# Patient Record
Sex: FEMALE | Race: WHITE | NOT HISPANIC OR LATINO | Employment: FULL TIME | URBAN - METROPOLITAN AREA
[De-identification: names, ages, dates, MRNs, and addresses within clinical notes are randomized per-mention and may not be internally consistent; named-entity substitution may affect disease eponyms.]

---

## 2017-04-19 ENCOUNTER — APPOINTMENT (OUTPATIENT)
Dept: WOMENS IMAGING | Facility: HOSPITAL | Age: 52
End: 2017-04-19

## 2017-04-19 PROCEDURE — 77067 SCR MAMMO BI INCL CAD: CPT | Performed by: RADIOLOGY

## 2017-04-19 PROCEDURE — 77063 BREAST TOMOSYNTHESIS BI: CPT | Performed by: RADIOLOGY

## 2017-05-22 ENCOUNTER — OFFICE VISIT (OUTPATIENT)
Dept: CARDIOLOGY | Facility: CLINIC | Age: 52
End: 2017-05-22

## 2017-05-22 VITALS
SYSTOLIC BLOOD PRESSURE: 140 MMHG | WEIGHT: 230 LBS | BODY MASS INDEX: 36.96 KG/M2 | HEIGHT: 66 IN | HEART RATE: 92 BPM | DIASTOLIC BLOOD PRESSURE: 80 MMHG

## 2017-05-22 DIAGNOSIS — J44.9 CHRONIC OBSTRUCTIVE PULMONARY DISEASE, UNSPECIFIED COPD TYPE (HCC): ICD-10-CM

## 2017-05-22 DIAGNOSIS — E78.2 MIXED HYPERLIPIDEMIA: ICD-10-CM

## 2017-05-22 DIAGNOSIS — I51.7 CARDIOMEGALY: ICD-10-CM

## 2017-05-22 DIAGNOSIS — R06.02 SHORTNESS OF BREATH: ICD-10-CM

## 2017-05-22 DIAGNOSIS — I10 ESSENTIAL HYPERTENSION: Primary | ICD-10-CM

## 2017-05-22 DIAGNOSIS — E66.9 OBESITY WITH BODY MASS INDEX OF 30.0-39.9: ICD-10-CM

## 2017-05-22 PROCEDURE — 99213 OFFICE O/P EST LOW 20 MIN: CPT | Performed by: NURSE PRACTITIONER

## 2017-05-22 RX ORDER — UBIDECARENONE 100 MG
100 CAPSULE ORAL DAILY
COMMUNITY
End: 2018-11-06 | Stop reason: ALTCHOICE

## 2017-05-22 RX ORDER — TRAMADOL HYDROCHLORIDE 50 MG/1
50 TABLET ORAL AS NEEDED
COMMUNITY
End: 2021-05-13 | Stop reason: HOSPADM

## 2017-05-22 RX ORDER — LANOLIN ALCOHOL/MO/W.PET/CERES
500 CREAM (GRAM) TOPICAL DAILY
COMMUNITY
End: 2022-05-24

## 2017-05-22 RX ORDER — AMOXICILLIN 875 MG/1
875 TABLET, COATED ORAL 2 TIMES DAILY
COMMUNITY
End: 2017-12-11 | Stop reason: ALTCHOICE

## 2017-05-22 RX ORDER — PAROXETINE HYDROCHLORIDE 20 MG/1
20 TABLET, FILM COATED ORAL EVERY MORNING
COMMUNITY
End: 2018-11-06 | Stop reason: DRUGHIGH

## 2017-05-22 RX ORDER — BENZONATATE 100 MG/1
100 CAPSULE ORAL 3 TIMES DAILY PRN
COMMUNITY
End: 2017-12-11 | Stop reason: ALTCHOICE

## 2017-05-22 RX ORDER — VALACYCLOVIR HYDROCHLORIDE 1 G/1
1000 TABLET, FILM COATED ORAL AS NEEDED
COMMUNITY

## 2017-05-22 RX ORDER — MULTIVITAMIN WITH IRON
TABLET ORAL DAILY
COMMUNITY
End: 2019-05-02 | Stop reason: ALTCHOICE

## 2017-05-22 RX ORDER — PREDNISONE 1 MG/1
5 TABLET ORAL DAILY
COMMUNITY
End: 2017-12-11 | Stop reason: ALTCHOICE

## 2017-05-22 RX ORDER — MELATONIN
1000 DAILY
COMMUNITY
End: 2018-11-06 | Stop reason: ALTCHOICE

## 2017-12-11 ENCOUNTER — OFFICE VISIT (OUTPATIENT)
Dept: CARDIOLOGY | Facility: CLINIC | Age: 52
End: 2017-12-11

## 2017-12-11 VITALS
WEIGHT: 231 LBS | HEIGHT: 66 IN | HEART RATE: 80 BPM | DIASTOLIC BLOOD PRESSURE: 80 MMHG | BODY MASS INDEX: 37.12 KG/M2 | SYSTOLIC BLOOD PRESSURE: 128 MMHG

## 2017-12-11 DIAGNOSIS — I10 ESSENTIAL HYPERTENSION: Primary | ICD-10-CM

## 2017-12-11 DIAGNOSIS — J44.9 COPD MIXED TYPE (HCC): ICD-10-CM

## 2017-12-11 DIAGNOSIS — E66.9 OBESITY WITH BODY MASS INDEX OF 30.0-39.9: ICD-10-CM

## 2017-12-11 DIAGNOSIS — I51.7 CARDIOMEGALY: ICD-10-CM

## 2017-12-11 DIAGNOSIS — E78.2 MIXED HYPERLIPIDEMIA: ICD-10-CM

## 2017-12-11 PROCEDURE — 99213 OFFICE O/P EST LOW 20 MIN: CPT | Performed by: NURSE PRACTITIONER

## 2018-05-09 ENCOUNTER — APPOINTMENT (OUTPATIENT)
Dept: WOMENS IMAGING | Facility: HOSPITAL | Age: 53
End: 2018-05-09

## 2018-05-09 PROCEDURE — 77063 BREAST TOMOSYNTHESIS BI: CPT | Performed by: RADIOLOGY

## 2018-05-09 PROCEDURE — 77067 SCR MAMMO BI INCL CAD: CPT | Performed by: RADIOLOGY

## 2018-10-23 ENCOUNTER — TELEPHONE (OUTPATIENT)
Dept: CARDIOLOGY | Facility: CLINIC | Age: 53
End: 2018-10-23

## 2018-11-06 ENCOUNTER — OFFICE VISIT (OUTPATIENT)
Dept: CARDIOLOGY | Facility: CLINIC | Age: 53
End: 2018-11-06

## 2018-11-06 VITALS
HEART RATE: 88 BPM | BODY MASS INDEX: 35.03 KG/M2 | WEIGHT: 218 LBS | SYSTOLIC BLOOD PRESSURE: 140 MMHG | HEIGHT: 66 IN | DIASTOLIC BLOOD PRESSURE: 70 MMHG

## 2018-11-06 DIAGNOSIS — E78.2 MIXED HYPERLIPIDEMIA: ICD-10-CM

## 2018-11-06 DIAGNOSIS — I51.7 CARDIOMEGALY: ICD-10-CM

## 2018-11-06 DIAGNOSIS — I10 ESSENTIAL HYPERTENSION: ICD-10-CM

## 2018-11-06 DIAGNOSIS — R53.83 OTHER FATIGUE: ICD-10-CM

## 2018-11-06 DIAGNOSIS — I31.39 PERICARDIAL EFFUSION: Primary | ICD-10-CM

## 2018-11-06 DIAGNOSIS — R06.02 SHORTNESS OF BREATH: ICD-10-CM

## 2018-11-06 DIAGNOSIS — J44.9 CHRONIC OBSTRUCTIVE PULMONARY DISEASE, UNSPECIFIED COPD TYPE (HCC): ICD-10-CM

## 2018-11-06 PROCEDURE — 99214 OFFICE O/P EST MOD 30 MIN: CPT | Performed by: NURSE PRACTITIONER

## 2018-11-06 RX ORDER — BUDESONIDE AND FORMOTEROL FUMARATE DIHYDRATE 160; 4.5 UG/1; UG/1
2 AEROSOL RESPIRATORY (INHALATION)
COMMUNITY
End: 2021-03-22

## 2018-11-06 RX ORDER — PAROXETINE 30 MG/1
30 TABLET, FILM COATED ORAL DAILY
COMMUNITY

## 2018-11-06 RX ORDER — BENZONATATE 100 MG/1
100 CAPSULE ORAL 3 TIMES DAILY PRN
COMMUNITY
End: 2019-05-02 | Stop reason: ALTCHOICE

## 2018-11-06 RX ORDER — LANSOPRAZOLE 30 MG/1
30 CAPSULE, DELAYED RELEASE ORAL DAILY PRN
COMMUNITY

## 2018-11-06 RX ORDER — FUROSEMIDE 20 MG/1
20 TABLET ORAL TAKE AS DIRECTED
COMMUNITY
End: 2018-11-09 | Stop reason: SDUPTHER

## 2018-11-06 RX ORDER — FEXOFENADINE HCL 180 MG/1
180 TABLET ORAL DAILY
COMMUNITY

## 2018-11-09 ENCOUNTER — TELEPHONE (OUTPATIENT)
Dept: CARDIOLOGY | Facility: CLINIC | Age: 53
End: 2018-11-09

## 2018-11-09 RX ORDER — FUROSEMIDE 20 MG/1
20 TABLET ORAL TAKE AS DIRECTED
Qty: 30 TABLET | Refills: 6 | Status: SHIPPED | OUTPATIENT
Start: 2018-11-09 | End: 2018-11-09 | Stop reason: SDUPTHER

## 2018-11-09 RX ORDER — FUROSEMIDE 20 MG/1
TABLET ORAL
Qty: 30 TABLET | Refills: 6 | Status: SHIPPED | OUTPATIENT
Start: 2018-11-09 | End: 2019-05-06 | Stop reason: SDUPTHER

## 2018-11-14 ENCOUNTER — HOSPITAL ENCOUNTER (OUTPATIENT)
Dept: CARDIOLOGY | Facility: HOSPITAL | Age: 53
Discharge: HOME | End: 2018-11-14
Admitting: NURSE PRACTITIONER

## 2018-11-14 VITALS — WEIGHT: 218.03 LBS | BODY MASS INDEX: 35.04 KG/M2 | HEIGHT: 66 IN

## 2018-11-14 DIAGNOSIS — R06.02 SHORTNESS OF BREATH: ICD-10-CM

## 2018-11-14 DIAGNOSIS — I31.39 PERICARDIAL EFFUSION: ICD-10-CM

## 2018-11-14 PROCEDURE — 93306 TTE W/DOPPLER COMPLETE: CPT

## 2018-11-14 PROCEDURE — 93306 TTE W/DOPPLER COMPLETE: CPT | Performed by: INTERNAL MEDICINE

## 2018-11-16 DIAGNOSIS — I31.39 PERICARDIAL EFFUSION: Primary | ICD-10-CM

## 2018-11-16 DIAGNOSIS — Z79.899 MEDICATION MANAGEMENT: ICD-10-CM

## 2018-11-16 LAB
BH CV ECHO MEAS - AO MAX PG: 12 MMHG
BH CV ECHO MEAS - AO MEAN PG: 5 MMHG
BH CV ECHO MEAS - AO ROOT AREA (BSA CORRECTED): 1.3
BH CV ECHO MEAS - AO ROOT AREA: 5.8 CM^2
BH CV ECHO MEAS - AO ROOT DIAM: 2.7 CM
BH CV ECHO MEAS - AO V2 MAX: 173 CM/SEC
BH CV ECHO MEAS - AO V2 MEAN: 100.8 CM/SEC
BH CV ECHO MEAS - AO V2 VTI: 29.7 CM
BH CV ECHO MEAS - BSA(HAYCOCK): 2.2 M^2
BH CV ECHO MEAS - BSA: 2.1 M^2
BH CV ECHO MEAS - BZI_BMI: 35.2 KILOGRAMS/M^2
BH CV ECHO MEAS - BZI_METRIC_HEIGHT: 167.6 CM
BH CV ECHO MEAS - BZI_METRIC_WEIGHT: 98.9 KG
BH CV ECHO MEAS - EDV(CUBED): 83.7 ML
BH CV ECHO MEAS - EDV(TEICH): 86.5 ML
BH CV ECHO MEAS - EF(CUBED): 76 %
BH CV ECHO MEAS - EF(TEICH): 68.3 %
BH CV ECHO MEAS - ESV(CUBED): 20.1 ML
BH CV ECHO MEAS - ESV(TEICH): 27.4 ML
BH CV ECHO MEAS - FS: 37.9 %
BH CV ECHO MEAS - IVS/LVPW: 0.98
BH CV ECHO MEAS - IVSD: 1.1 CM
BH CV ECHO MEAS - LA DIMENSION: 3.8 CM
BH CV ECHO MEAS - LA/AO: 1.4
BH CV ECHO MEAS - LV IVRT: 0.08 SEC
BH CV ECHO MEAS - LV MASS(C)D: 164.8 GRAMS
BH CV ECHO MEAS - LV MASS(C)DI: 79.4 GRAMS/M^2
BH CV ECHO MEAS - LVIDD: 4.4 CM
BH CV ECHO MEAS - LVIDS: 2.7 CM
BH CV ECHO MEAS - LVPWD: 1.1 CM
BH CV ECHO MEAS - MITRAL HR: 262.7 BPM
BH CV ECHO MEAS - MITRAL R-R: 0.23 SEC
BH CV ECHO MEAS - MV A MAX VEL: 115.3 CM/SEC
BH CV ECHO MEAS - MV DEC SLOPE: 557.4 CM/SEC^2
BH CV ECHO MEAS - MV DEC TIME: 0.15 SEC
BH CV ECHO MEAS - MV E MAX VEL: 80.9 CM/SEC
BH CV ECHO MEAS - MV E/A: 0.7
BH CV ECHO MEAS - MV MAX PG: 8.3 MMHG
BH CV ECHO MEAS - MV MEAN PG: 4.7 MMHG
BH CV ECHO MEAS - MV V2 MAX: 144.1 CM/SEC
BH CV ECHO MEAS - MV V2 MEAN: 102.6 CM/SEC
BH CV ECHO MEAS - MV V2 VTI: 23.4 CM
BH CV ECHO MEAS - PA MAX PG: 7 MMHG
BH CV ECHO MEAS - PA MEAN PG: 3.4 MMHG
BH CV ECHO MEAS - PA V2 MAX: 132.5 CM/SEC
BH CV ECHO MEAS - PA V2 MEAN: 82.3 CM/SEC
BH CV ECHO MEAS - PA V2 VTI: 25.3 CM
BH CV ECHO MEAS - PULM. HR: 194.8 BPM
BH CV ECHO MEAS - PULM. R-R: 0.31 SEC
BH CV ECHO MEAS - RAP SYSTOLE: 10 MMHG
BH CV ECHO MEAS - RVDD: 2.8 CM
BH CV ECHO MEAS - RVSP: 17.8 MMHG
BH CV ECHO MEAS - SI(AO): 83.2 ML/M^2
BH CV ECHO MEAS - SI(CUBED): 30.7 ML/M^2
BH CV ECHO MEAS - SI(TEICH): 28.4 ML/M^2
BH CV ECHO MEAS - SV(AO): 172.7 ML
BH CV ECHO MEAS - SV(CUBED): 63.6 ML
BH CV ECHO MEAS - SV(TEICH): 59 ML
BH CV ECHO MEAS - TR MAX VEL: 139.7 CM/SEC
MAXIMAL PREDICTED HEART RATE: 165 BPM
STRESS TARGET HR: 140 BPM

## 2018-11-19 ENCOUNTER — LAB (OUTPATIENT)
Dept: LAB | Facility: HOSPITAL | Age: 53
End: 2018-11-19

## 2018-11-19 DIAGNOSIS — I31.39 PERICARDIAL EFFUSION: ICD-10-CM

## 2018-11-19 DIAGNOSIS — Z79.899 MEDICATION MANAGEMENT: ICD-10-CM

## 2018-11-19 LAB — ERYTHROCYTE [SEDIMENTATION RATE] IN BLOOD: 18 MM/HR (ref 0–30)

## 2018-11-19 PROCEDURE — 85652 RBC SED RATE AUTOMATED: CPT | Performed by: NURSE PRACTITIONER

## 2018-11-19 PROCEDURE — 36415 COLL VENOUS BLD VENIPUNCTURE: CPT

## 2018-11-20 ENCOUNTER — TELEPHONE (OUTPATIENT)
Dept: CARDIOLOGY | Facility: CLINIC | Age: 53
End: 2018-11-20

## 2019-01-18 ENCOUNTER — TELEPHONE (OUTPATIENT)
Dept: CARDIOLOGY | Facility: CLINIC | Age: 54
End: 2019-01-18

## 2019-01-18 DIAGNOSIS — R06.02 SHORTNESS OF BREATH: ICD-10-CM

## 2019-01-18 DIAGNOSIS — I31.39 PERICARDIAL EFFUSION: Primary | ICD-10-CM

## 2019-01-18 DIAGNOSIS — Z79.899 MEDICATION MANAGEMENT: ICD-10-CM

## 2019-05-02 ENCOUNTER — DOCUMENTATION (OUTPATIENT)
Dept: CARDIOLOGY | Facility: CLINIC | Age: 54
End: 2019-05-02

## 2019-05-02 ENCOUNTER — OFFICE VISIT (OUTPATIENT)
Dept: CARDIOLOGY | Facility: CLINIC | Age: 54
End: 2019-05-02

## 2019-05-02 VITALS
SYSTOLIC BLOOD PRESSURE: 140 MMHG | BODY MASS INDEX: 36 KG/M2 | WEIGHT: 224 LBS | HEIGHT: 66 IN | DIASTOLIC BLOOD PRESSURE: 70 MMHG | HEART RATE: 68 BPM

## 2019-05-02 DIAGNOSIS — I10 ESSENTIAL HYPERTENSION: Primary | ICD-10-CM

## 2019-05-02 DIAGNOSIS — E78.2 MIXED HYPERLIPIDEMIA: ICD-10-CM

## 2019-05-02 DIAGNOSIS — I51.7 CARDIOMEGALY: ICD-10-CM

## 2019-05-02 DIAGNOSIS — R25.2 LEG CRAMPS: ICD-10-CM

## 2019-05-02 DIAGNOSIS — R07.89 CHEST DISCOMFORT: ICD-10-CM

## 2019-05-02 DIAGNOSIS — R00.2 PALPITATIONS: ICD-10-CM

## 2019-05-02 DIAGNOSIS — R09.89 LABILE HYPERTENSION: ICD-10-CM

## 2019-05-02 PROCEDURE — 99214 OFFICE O/P EST MOD 30 MIN: CPT | Performed by: NURSE PRACTITIONER

## 2019-05-02 RX ORDER — LOSARTAN POTASSIUM 25 MG/1
25 TABLET ORAL DAILY
COMMUNITY
End: 2019-11-12 | Stop reason: SDUPTHER

## 2019-05-02 RX ORDER — ALBUTEROL SULFATE 2.5 MG/3ML
2.5 SOLUTION RESPIRATORY (INHALATION) EVERY 4 HOURS PRN
COMMUNITY

## 2019-05-02 RX ORDER — CHOLECALCIFEROL (VITAMIN D3) 1250 MCG
50000 CAPSULE ORAL DAILY
COMMUNITY

## 2019-05-02 RX ORDER — DOXYCYCLINE HYCLATE 100 MG/1
100 CAPSULE ORAL 2 TIMES DAILY
COMMUNITY
End: 2019-06-17 | Stop reason: ALTCHOICE

## 2019-05-03 ENCOUNTER — TELEPHONE (OUTPATIENT)
Dept: CARDIOLOGY | Facility: CLINIC | Age: 54
End: 2019-05-03

## 2019-05-06 ENCOUNTER — TELEPHONE (OUTPATIENT)
Dept: CARDIOLOGY | Facility: CLINIC | Age: 54
End: 2019-05-06

## 2019-05-06 RX ORDER — FUROSEMIDE 20 MG/1
TABLET ORAL
Qty: 30 TABLET | Refills: 6 | Status: SHIPPED | OUTPATIENT
Start: 2019-05-06 | End: 2019-06-17

## 2019-05-07 ENCOUNTER — TELEPHONE (OUTPATIENT)
Dept: CARDIOLOGY | Facility: CLINIC | Age: 54
End: 2019-05-07

## 2019-05-31 ENCOUNTER — OUTSIDE FACILITY SERVICE (OUTPATIENT)
Dept: CARDIOLOGY | Facility: CLINIC | Age: 54
End: 2019-05-31

## 2019-05-31 PROCEDURE — 93272 ECG/REVIEW INTERPRET ONLY: CPT | Performed by: INTERNAL MEDICINE

## 2019-06-17 ENCOUNTER — OFFICE VISIT (OUTPATIENT)
Dept: CARDIOLOGY | Facility: CLINIC | Age: 54
End: 2019-06-17

## 2019-06-17 ENCOUNTER — TELEPHONE (OUTPATIENT)
Dept: CARDIOLOGY | Facility: CLINIC | Age: 54
End: 2019-06-17

## 2019-06-17 VITALS
HEART RATE: 82 BPM | HEIGHT: 66 IN | SYSTOLIC BLOOD PRESSURE: 160 MMHG | BODY MASS INDEX: 36.32 KG/M2 | WEIGHT: 226 LBS | DIASTOLIC BLOOD PRESSURE: 78 MMHG

## 2019-06-17 DIAGNOSIS — G47.34 NOCTURNAL OXYGEN DESATURATION: ICD-10-CM

## 2019-06-17 DIAGNOSIS — J43.9 PULMONARY EMPHYSEMA, UNSPECIFIED EMPHYSEMA TYPE (HCC): ICD-10-CM

## 2019-06-17 DIAGNOSIS — R60.1 GENERALIZED EDEMA: ICD-10-CM

## 2019-06-17 DIAGNOSIS — R06.02 SHORTNESS OF BREATH: ICD-10-CM

## 2019-06-17 DIAGNOSIS — E78.2 MIXED HYPERLIPIDEMIA: ICD-10-CM

## 2019-06-17 DIAGNOSIS — I10 ESSENTIAL HYPERTENSION: Primary | ICD-10-CM

## 2019-06-17 PROBLEM — I51.7 CARDIOMEGALY: Status: RESOLVED | Noted: 2017-05-22 | Resolved: 2019-06-17

## 2019-06-17 PROCEDURE — 99213 OFFICE O/P EST LOW 20 MIN: CPT | Performed by: NURSE PRACTITIONER

## 2019-06-17 RX ORDER — FUROSEMIDE 20 MG/1
TABLET ORAL
Qty: 30 TABLET | Refills: 6 | Status: SHIPPED | OUTPATIENT
Start: 2019-06-17 | End: 2019-11-12 | Stop reason: SDUPTHER

## 2019-06-17 RX ORDER — FLUTICASONE PROPIONATE 50 MCG
2 SPRAY, SUSPENSION (ML) NASAL DAILY
COMMUNITY

## 2019-06-17 RX ORDER — ALPRAZOLAM 1 MG/1
1 TABLET ORAL 2 TIMES DAILY PRN
COMMUNITY

## 2019-06-17 RX ORDER — POTASSIUM CHLORIDE 750 MG/1
10 TABLET, FILM COATED, EXTENDED RELEASE ORAL DAILY
Qty: 30 TABLET | Refills: 6 | Status: SHIPPED | OUTPATIENT
Start: 2019-06-17 | End: 2019-11-12 | Stop reason: SDUPTHER

## 2019-08-16 ENCOUNTER — APPOINTMENT (OUTPATIENT)
Dept: WOMENS IMAGING | Facility: HOSPITAL | Age: 54
End: 2019-08-16

## 2019-08-16 PROCEDURE — 77063 BREAST TOMOSYNTHESIS BI: CPT | Performed by: RADIOLOGY

## 2019-08-16 PROCEDURE — 77067 SCR MAMMO BI INCL CAD: CPT | Performed by: RADIOLOGY

## 2019-11-12 ENCOUNTER — OFFICE VISIT (OUTPATIENT)
Dept: CARDIOLOGY | Facility: CLINIC | Age: 54
End: 2019-11-12

## 2019-11-12 VITALS
WEIGHT: 209.4 LBS | BODY MASS INDEX: 33.65 KG/M2 | HEART RATE: 70 BPM | DIASTOLIC BLOOD PRESSURE: 68 MMHG | HEIGHT: 66 IN | SYSTOLIC BLOOD PRESSURE: 128 MMHG

## 2019-11-12 DIAGNOSIS — E66.9 OBESITY WITH BODY MASS INDEX OF 30.0-39.9: ICD-10-CM

## 2019-11-12 DIAGNOSIS — J43.9 PULMONARY EMPHYSEMA, UNSPECIFIED EMPHYSEMA TYPE (HCC): ICD-10-CM

## 2019-11-12 DIAGNOSIS — R06.02 SHORTNESS OF BREATH: ICD-10-CM

## 2019-11-12 DIAGNOSIS — I10 ESSENTIAL HYPERTENSION: Primary | ICD-10-CM

## 2019-11-12 DIAGNOSIS — E78.2 MIXED HYPERLIPIDEMIA: ICD-10-CM

## 2019-11-12 DIAGNOSIS — G47.34 NOCTURNAL OXYGEN DESATURATION: ICD-10-CM

## 2019-11-12 PROCEDURE — 99213 OFFICE O/P EST LOW 20 MIN: CPT | Performed by: NURSE PRACTITIONER

## 2019-11-12 RX ORDER — MULTIVITAMIN WITH IRON
1 TABLET ORAL
COMMUNITY
End: 2022-05-24

## 2019-11-12 RX ORDER — POTASSIUM CHLORIDE 750 MG/1
10 TABLET, FILM COATED, EXTENDED RELEASE ORAL DAILY
Qty: 30 TABLET | Refills: 8 | Status: SHIPPED | OUTPATIENT
Start: 2019-11-12 | End: 2020-08-03 | Stop reason: ALTCHOICE

## 2019-11-12 RX ORDER — FUROSEMIDE 20 MG/1
TABLET ORAL
Qty: 30 TABLET | Refills: 8 | Status: SHIPPED | OUTPATIENT
Start: 2019-11-12 | End: 2021-03-22 | Stop reason: SDUPTHER

## 2019-11-12 RX ORDER — FENOFIBRATE 145 MG/1
145 TABLET, COATED ORAL DAILY
Qty: 30 TABLET | Refills: 8 | Status: SHIPPED | OUTPATIENT
Start: 2019-11-12 | End: 2021-03-22 | Stop reason: SDUPTHER

## 2019-11-12 RX ORDER — DILTIAZEM HYDROCHLORIDE 240 MG/1
240 CAPSULE, COATED, EXTENDED RELEASE ORAL DAILY
Qty: 30 CAPSULE | Refills: 8 | Status: SHIPPED | OUTPATIENT
Start: 2019-11-12 | End: 2021-03-22 | Stop reason: SDUPTHER

## 2019-11-12 RX ORDER — LOSARTAN POTASSIUM 25 MG/1
25 TABLET ORAL DAILY
Qty: 30 TABLET | Refills: 8 | Status: SHIPPED | OUTPATIENT
Start: 2019-11-12 | End: 2021-03-22 | Stop reason: SDUPTHER

## 2020-08-03 ENCOUNTER — OFFICE VISIT (OUTPATIENT)
Dept: CARDIOLOGY | Facility: CLINIC | Age: 55
End: 2020-08-03

## 2020-08-03 VITALS
HEART RATE: 72 BPM | HEIGHT: 66 IN | DIASTOLIC BLOOD PRESSURE: 80 MMHG | SYSTOLIC BLOOD PRESSURE: 130 MMHG | WEIGHT: 213 LBS | TEMPERATURE: 98 F | BODY MASS INDEX: 34.23 KG/M2

## 2020-08-03 DIAGNOSIS — R06.02 SHORTNESS OF BREATH: ICD-10-CM

## 2020-08-03 DIAGNOSIS — I10 ESSENTIAL HYPERTENSION: Primary | ICD-10-CM

## 2020-08-03 DIAGNOSIS — J43.9 PULMONARY EMPHYSEMA, UNSPECIFIED EMPHYSEMA TYPE (HCC): ICD-10-CM

## 2020-08-03 DIAGNOSIS — R00.2 PALPITATIONS: ICD-10-CM

## 2020-08-03 DIAGNOSIS — E78.2 MIXED HYPERLIPIDEMIA: ICD-10-CM

## 2020-08-03 DIAGNOSIS — E66.9 OBESITY (BMI 30.0-34.9): ICD-10-CM

## 2020-08-03 PROCEDURE — 99213 OFFICE O/P EST LOW 20 MIN: CPT | Performed by: NURSE PRACTITIONER

## 2020-08-03 RX ORDER — POTASSIUM CHLORIDE 750 MG/1
10 TABLET, FILM COATED, EXTENDED RELEASE ORAL DAILY
COMMUNITY
End: 2022-05-24 | Stop reason: SINTOL

## 2020-08-03 RX ORDER — AMOXICILLIN 500 MG/1
1000 CAPSULE ORAL 2 TIMES DAILY
Qty: 28 CAPSULE | Refills: 0 | Status: SHIPPED | OUTPATIENT
Start: 2020-08-03 | End: 2021-03-22

## 2020-08-03 RX ORDER — SENNOSIDES 8.6 MG
650 CAPSULE ORAL EVERY 8 HOURS PRN
COMMUNITY

## 2020-08-19 ENCOUNTER — APPOINTMENT (OUTPATIENT)
Dept: WOMENS IMAGING | Facility: HOSPITAL | Age: 55
End: 2020-08-19

## 2020-08-19 PROCEDURE — 77067 SCR MAMMO BI INCL CAD: CPT | Performed by: RADIOLOGY

## 2020-08-19 PROCEDURE — 77063 BREAST TOMOSYNTHESIS BI: CPT | Performed by: RADIOLOGY

## 2021-03-22 ENCOUNTER — OFFICE VISIT (OUTPATIENT)
Dept: CARDIOLOGY | Facility: CLINIC | Age: 56
End: 2021-03-22

## 2021-03-22 VITALS
BODY MASS INDEX: 35.74 KG/M2 | DIASTOLIC BLOOD PRESSURE: 68 MMHG | HEIGHT: 66 IN | TEMPERATURE: 97.7 F | SYSTOLIC BLOOD PRESSURE: 138 MMHG | WEIGHT: 222.4 LBS | HEART RATE: 82 BPM

## 2021-03-22 DIAGNOSIS — E78.2 MIXED HYPERLIPIDEMIA: ICD-10-CM

## 2021-03-22 DIAGNOSIS — J43.9 PULMONARY EMPHYSEMA, UNSPECIFIED EMPHYSEMA TYPE (HCC): ICD-10-CM

## 2021-03-22 DIAGNOSIS — R06.02 SHORTNESS OF BREATH: ICD-10-CM

## 2021-03-22 DIAGNOSIS — I10 ESSENTIAL HYPERTENSION: Primary | ICD-10-CM

## 2021-03-22 DIAGNOSIS — E66.9 OBESITY WITH BODY MASS INDEX OF 30.0-39.9: ICD-10-CM

## 2021-03-22 DIAGNOSIS — R60.1 GENERALIZED EDEMA: ICD-10-CM

## 2021-03-22 PROCEDURE — 99213 OFFICE O/P EST LOW 20 MIN: CPT | Performed by: NURSE PRACTITIONER

## 2021-03-22 RX ORDER — FUROSEMIDE 20 MG/1
20 TABLET ORAL DAILY
COMMUNITY
End: 2021-03-22

## 2021-03-22 RX ORDER — FENOFIBRATE 145 MG/1
145 TABLET, COATED ORAL DAILY
Qty: 90 TABLET | Refills: 3 | Status: SHIPPED | OUTPATIENT
Start: 2021-03-22 | End: 2022-12-14 | Stop reason: SDUPTHER

## 2021-03-22 RX ORDER — DILTIAZEM HYDROCHLORIDE 240 MG/1
240 CAPSULE, COATED, EXTENDED RELEASE ORAL DAILY
Qty: 90 CAPSULE | Refills: 3 | Status: SHIPPED | OUTPATIENT
Start: 2021-03-22 | End: 2022-05-24

## 2021-03-22 RX ORDER — LOSARTAN POTASSIUM 25 MG/1
25 TABLET ORAL DAILY
Qty: 90 TABLET | Refills: 3 | Status: SHIPPED | OUTPATIENT
Start: 2021-03-22

## 2021-03-22 RX ORDER — FUROSEMIDE 20 MG/1
20 TABLET ORAL 2 TIMES DAILY
Qty: 180 TABLET | Refills: 3 | Status: SHIPPED | OUTPATIENT
Start: 2021-03-22 | End: 2022-05-24 | Stop reason: SDUPTHER

## 2021-04-26 ENCOUNTER — TELEPHONE (OUTPATIENT)
Dept: CARDIOLOGY | Facility: CLINIC | Age: 56
End: 2021-04-26

## 2021-05-10 ENCOUNTER — APPOINTMENT (OUTPATIENT)
Dept: PREADMISSION TESTING | Facility: HOSPITAL | Age: 56
End: 2021-05-10

## 2021-05-10 ENCOUNTER — HOSPITAL ENCOUNTER (OUTPATIENT)
Dept: GENERAL RADIOLOGY | Facility: HOSPITAL | Age: 56
Discharge: HOME | End: 2021-05-10

## 2021-05-10 ENCOUNTER — PRE-ADMISSION TESTING (OUTPATIENT)
Dept: PREADMISSION TESTING | Facility: HOSPITAL | Age: 56
End: 2021-05-10

## 2021-05-10 VITALS — BODY MASS INDEX: 34.17 KG/M2 | WEIGHT: 212.6 LBS | HEIGHT: 66 IN

## 2021-05-10 LAB
ALBUMIN SERPL-MCNC: 4.4 G/DL (ref 3.5–5.2)
ALBUMIN/GLOB SERPL: 2 G/DL
ALP SERPL-CCNC: 64 U/L (ref 39–117)
ALT SERPL W P-5'-P-CCNC: 22 U/L (ref 1–33)
ANION GAP SERPL CALCULATED.3IONS-SCNC: 12 MMOL/L (ref 5–15)
APTT PPP: 23.4 SECONDS (ref 22–39)
AST SERPL-CCNC: 31 U/L (ref 1–32)
BASOPHILS # BLD AUTO: 0.06 10*3/MM3 (ref 0–0.2)
BASOPHILS NFR BLD AUTO: 0.5 % (ref 0–1.5)
BILIRUB SERPL-MCNC: 0.4 MG/DL (ref 0–1.2)
BUN SERPL-MCNC: 21 MG/DL (ref 6–20)
BUN/CREAT SERPL: 29.2 (ref 7–25)
CALCIUM SPEC-SCNC: 10 MG/DL (ref 8.6–10.5)
CHLORIDE SERPL-SCNC: 104 MMOL/L (ref 98–107)
CO2 SERPL-SCNC: 25 MMOL/L (ref 22–29)
CREAT SERPL-MCNC: 0.72 MG/DL (ref 0.57–1)
DEPRECATED RDW RBC AUTO: 43.9 FL (ref 37–54)
EOSINOPHIL # BLD AUTO: 0.15 10*3/MM3 (ref 0–0.4)
EOSINOPHIL NFR BLD AUTO: 1.4 % (ref 0.3–6.2)
ERYTHROCYTE [DISTWIDTH] IN BLOOD BY AUTOMATED COUNT: 12.7 % (ref 12.3–15.4)
GFR SERPL CREATININE-BSD FRML MDRD: 83 ML/MIN/1.73
GLOBULIN UR ELPH-MCNC: 2.2 GM/DL
GLUCOSE SERPL-MCNC: 97 MG/DL (ref 65–99)
HBA1C MFR BLD: 5.4 % (ref 4.8–5.6)
HCT VFR BLD AUTO: 45.2 % (ref 34–46.6)
HGB BLD-MCNC: 14.5 G/DL (ref 12–15.9)
IMM GRANULOCYTES # BLD AUTO: 0.05 10*3/MM3 (ref 0–0.05)
IMM GRANULOCYTES NFR BLD AUTO: 0.5 % (ref 0–0.5)
INR PPP: 1.01 (ref 0.85–1.16)
LYMPHOCYTES # BLD AUTO: 2.03 10*3/MM3 (ref 0.7–3.1)
LYMPHOCYTES NFR BLD AUTO: 18.5 % (ref 19.6–45.3)
MCH RBC QN AUTO: 30.2 PG (ref 26.6–33)
MCHC RBC AUTO-ENTMCNC: 32.1 G/DL (ref 31.5–35.7)
MCV RBC AUTO: 94.2 FL (ref 79–97)
MONOCYTES # BLD AUTO: 0.8 10*3/MM3 (ref 0.1–0.9)
MONOCYTES NFR BLD AUTO: 7.3 % (ref 5–12)
NEUTROPHILS NFR BLD AUTO: 7.9 10*3/MM3 (ref 1.7–7)
NEUTROPHILS NFR BLD AUTO: 71.8 % (ref 42.7–76)
NRBC BLD AUTO-RTO: 0 /100 WBC (ref 0–0.2)
PLATELET # BLD AUTO: 360 10*3/MM3 (ref 140–450)
PMV BLD AUTO: 10.7 FL (ref 6–12)
POTASSIUM SERPL-SCNC: 4.8 MMOL/L (ref 3.5–5.2)
PROT SERPL-MCNC: 6.6 G/DL (ref 6–8.5)
PROTHROMBIN TIME: 13 SECONDS (ref 11.4–14.4)
QT INTERVAL: 360 MS
QTC INTERVAL: 454 MS
RBC # BLD AUTO: 4.8 10*6/MM3 (ref 3.77–5.28)
SARS-COV-2 RNA PNL SPEC NAA+PROBE: NOT DETECTED
SODIUM SERPL-SCNC: 141 MMOL/L (ref 136–145)
WBC # BLD AUTO: 10.99 10*3/MM3 (ref 3.4–10.8)

## 2021-05-10 PROCEDURE — 93010 ELECTROCARDIOGRAM REPORT: CPT | Performed by: INTERNAL MEDICINE

## 2021-05-10 PROCEDURE — 83036 HEMOGLOBIN GLYCOSYLATED A1C: CPT

## 2021-05-10 PROCEDURE — U0004 COV-19 TEST NON-CDC HGH THRU: HCPCS

## 2021-05-10 PROCEDURE — 85730 THROMBOPLASTIN TIME PARTIAL: CPT

## 2021-05-10 PROCEDURE — 36415 COLL VENOUS BLD VENIPUNCTURE: CPT

## 2021-05-10 PROCEDURE — C9803 HOPD COVID-19 SPEC COLLECT: HCPCS

## 2021-05-10 PROCEDURE — 93005 ELECTROCARDIOGRAM TRACING: CPT

## 2021-05-10 PROCEDURE — 80053 COMPREHEN METABOLIC PANEL: CPT

## 2021-05-10 PROCEDURE — 85610 PROTHROMBIN TIME: CPT

## 2021-05-10 PROCEDURE — 85025 COMPLETE CBC W/AUTO DIFF WBC: CPT

## 2021-05-10 PROCEDURE — 71046 X-RAY EXAM CHEST 2 VIEWS: CPT

## 2021-05-10 RX ORDER — DOCUSATE SODIUM 100 MG/1
100 CAPSULE, LIQUID FILLED ORAL 2 TIMES DAILY PRN
COMMUNITY
End: 2022-05-24

## 2021-05-12 ENCOUNTER — ANESTHESIA EVENT (OUTPATIENT)
Dept: PERIOP | Facility: HOSPITAL | Age: 56
End: 2021-05-12

## 2021-05-12 RX ORDER — FAMOTIDINE 10 MG/ML
20 INJECTION, SOLUTION INTRAVENOUS ONCE
Status: CANCELLED | OUTPATIENT
Start: 2021-05-12 | End: 2021-05-12

## 2021-05-13 ENCOUNTER — ANESTHESIA EVENT CONVERTED (OUTPATIENT)
Dept: ANESTHESIOLOGY | Facility: HOSPITAL | Age: 56
End: 2021-05-13

## 2021-05-13 ENCOUNTER — ANESTHESIA (OUTPATIENT)
Dept: PERIOP | Facility: HOSPITAL | Age: 56
End: 2021-05-13

## 2021-05-13 ENCOUNTER — HOSPITAL ENCOUNTER (OUTPATIENT)
Facility: HOSPITAL | Age: 56
Setting detail: HOSPITAL OUTPATIENT SURGERY
Discharge: HOME | End: 2021-05-13
Attending: ORTHOPAEDIC SURGERY | Admitting: ORTHOPAEDIC SURGERY

## 2021-05-13 VITALS
OXYGEN SATURATION: 95 % | HEIGHT: 66 IN | BODY MASS INDEX: 34.17 KG/M2 | TEMPERATURE: 97.7 F | HEART RATE: 75 BPM | RESPIRATION RATE: 14 BRPM | DIASTOLIC BLOOD PRESSURE: 66 MMHG | WEIGHT: 212.6 LBS | SYSTOLIC BLOOD PRESSURE: 131 MMHG

## 2021-05-13 DIAGNOSIS — E66.9 OBESITY WITH BODY MASS INDEX OF 30.0-39.9: Primary | ICD-10-CM

## 2021-05-13 PROCEDURE — 25010000002 NEOSTIGMINE 10 MG/10ML SOLUTION: Performed by: NURSE ANESTHETIST, CERTIFIED REGISTERED

## 2021-05-13 PROCEDURE — 25010000003 LIDOCAINE 1 % SOLUTION: Performed by: NURSE ANESTHETIST, CERTIFIED REGISTERED

## 2021-05-13 PROCEDURE — 25010000002 PROPOFOL 10 MG/ML EMULSION: Performed by: NURSE ANESTHETIST, CERTIFIED REGISTERED

## 2021-05-13 PROCEDURE — 25010000002 TRIAMCINOLONE PER 10 MG: Performed by: ORTHOPAEDIC SURGERY

## 2021-05-13 PROCEDURE — 94640 AIRWAY INHALATION TREATMENT: CPT

## 2021-05-13 PROCEDURE — 25010000002 DEXAMETHASONE PER 1 MG: Performed by: NURSE ANESTHETIST, CERTIFIED REGISTERED

## 2021-05-13 PROCEDURE — 25010000002 ONDANSETRON PER 1 MG: Performed by: NURSE ANESTHETIST, CERTIFIED REGISTERED

## 2021-05-13 PROCEDURE — 25010000002 ROPIVACINE HCL-NACL: Performed by: NURSE ANESTHETIST, CERTIFIED REGISTERED

## 2021-05-13 PROCEDURE — 25010000002 EPINEPHRINE PER 0.1 MG: Performed by: ORTHOPAEDIC SURGERY

## 2021-05-13 PROCEDURE — 25010000003 CEFAZOLIN IN DEXTROSE 2-4 GM/100ML-% SOLUTION: Performed by: ORTHOPAEDIC SURGERY

## 2021-05-13 PROCEDURE — 25010000003 LIDOCAINE 1 % SOLUTION 20 ML VIAL: Performed by: ORTHOPAEDIC SURGERY

## 2021-05-13 RX ORDER — DROPERIDOL 2.5 MG/ML
0.62 INJECTION, SOLUTION INTRAMUSCULAR; INTRAVENOUS ONCE AS NEEDED
Status: CANCELLED | OUTPATIENT
Start: 2021-05-13

## 2021-05-13 RX ORDER — LABETALOL HYDROCHLORIDE 5 MG/ML
5 INJECTION, SOLUTION INTRAVENOUS
Status: CANCELLED | OUTPATIENT
Start: 2021-05-13

## 2021-05-13 RX ORDER — SODIUM CHLORIDE 0.9 % (FLUSH) 0.9 %
10 SYRINGE (ML) INJECTION EVERY 12 HOURS SCHEDULED
Status: DISCONTINUED | OUTPATIENT
Start: 2021-05-13 | End: 2021-05-13 | Stop reason: HOSPADM

## 2021-05-13 RX ORDER — NEOSTIGMINE METHYLSULFATE 1 MG/ML
INJECTION, SOLUTION INTRAVENOUS AS NEEDED
Status: DISCONTINUED | OUTPATIENT
Start: 2021-05-13 | End: 2021-05-13 | Stop reason: SURG

## 2021-05-13 RX ORDER — ALBUTEROL SULFATE 2.5 MG/3ML
2.5 SOLUTION RESPIRATORY (INHALATION) EVERY 6 HOURS PRN
Status: DISCONTINUED | OUTPATIENT
Start: 2021-05-13 | End: 2021-05-13 | Stop reason: HOSPADM

## 2021-05-13 RX ORDER — GLYCOPYRROLATE 0.2 MG/ML
INJECTION INTRAMUSCULAR; INTRAVENOUS AS NEEDED
Status: DISCONTINUED | OUTPATIENT
Start: 2021-05-13 | End: 2021-05-13 | Stop reason: SURG

## 2021-05-13 RX ORDER — SODIUM CHLORIDE, SODIUM LACTATE, POTASSIUM CHLORIDE, CALCIUM CHLORIDE 600; 310; 30; 20 MG/100ML; MG/100ML; MG/100ML; MG/100ML
9 INJECTION, SOLUTION INTRAVENOUS CONTINUOUS
Status: DISCONTINUED | OUTPATIENT
Start: 2021-05-13 | End: 2021-05-13 | Stop reason: HOSPADM

## 2021-05-13 RX ORDER — LIDOCAINE HYDROCHLORIDE 10 MG/ML
INJECTION, SOLUTION INFILTRATION; PERINEURAL AS NEEDED
Status: DISCONTINUED | OUTPATIENT
Start: 2021-05-13 | End: 2021-05-13 | Stop reason: SURG

## 2021-05-13 RX ORDER — HYDROCODONE BITARTRATE AND ACETAMINOPHEN 5; 325 MG/1; MG/1
2 TABLET ORAL EVERY 4 HOURS PRN
Qty: 50 TABLET | Refills: 0 | Status: SHIPPED | OUTPATIENT
Start: 2021-05-13 | End: 2022-05-24 | Stop reason: ALTCHOICE

## 2021-05-13 RX ORDER — FENTANYL CITRATE 50 UG/ML
50 INJECTION, SOLUTION INTRAMUSCULAR; INTRAVENOUS
Status: CANCELLED | OUTPATIENT
Start: 2021-05-13

## 2021-05-13 RX ORDER — ALBUTEROL SULFATE 1.25 MG/3ML
1.25 SOLUTION RESPIRATORY (INHALATION) ONCE
Status: CANCELLED | OUTPATIENT
Start: 2021-05-13 | End: 2021-05-13

## 2021-05-13 RX ORDER — LIDOCAINE HYDROCHLORIDE 10 MG/ML
0.5 INJECTION, SOLUTION EPIDURAL; INFILTRATION; INTRACAUDAL; PERINEURAL ONCE AS NEEDED
Status: COMPLETED | OUTPATIENT
Start: 2021-05-13 | End: 2021-05-13

## 2021-05-13 RX ORDER — PROPOFOL 10 MG/ML
VIAL (ML) INTRAVENOUS AS NEEDED
Status: DISCONTINUED | OUTPATIENT
Start: 2021-05-13 | End: 2021-05-13 | Stop reason: SURG

## 2021-05-13 RX ORDER — ONDANSETRON 2 MG/ML
INJECTION INTRAMUSCULAR; INTRAVENOUS AS NEEDED
Status: DISCONTINUED | OUTPATIENT
Start: 2021-05-13 | End: 2021-05-13 | Stop reason: SURG

## 2021-05-13 RX ORDER — CEFAZOLIN SODIUM 2 G/100ML
2 INJECTION, SOLUTION INTRAVENOUS ONCE
Status: COMPLETED | OUTPATIENT
Start: 2021-05-13 | End: 2021-05-13

## 2021-05-13 RX ORDER — ROCURONIUM BROMIDE 10 MG/ML
INJECTION, SOLUTION INTRAVENOUS AS NEEDED
Status: DISCONTINUED | OUTPATIENT
Start: 2021-05-13 | End: 2021-05-13 | Stop reason: SURG

## 2021-05-13 RX ORDER — SODIUM CHLORIDE, SODIUM LACTATE, POTASSIUM CHLORIDE, AND CALCIUM CHLORIDE .6; .31; .03; .02 G/100ML; G/100ML; G/100ML; G/100ML
IRRIGANT IRRIGATION AS NEEDED
Status: DISCONTINUED | OUTPATIENT
Start: 2021-05-13 | End: 2021-05-13 | Stop reason: HOSPADM

## 2021-05-13 RX ORDER — ESMOLOL HYDROCHLORIDE 10 MG/ML
INJECTION INTRAVENOUS AS NEEDED
Status: DISCONTINUED | OUTPATIENT
Start: 2021-05-13 | End: 2021-05-13 | Stop reason: SURG

## 2021-05-13 RX ORDER — BUPIVACAINE HYDROCHLORIDE 2.5 MG/ML
INJECTION, SOLUTION EPIDURAL; INFILTRATION; INTRACAUDAL
Status: COMPLETED | OUTPATIENT
Start: 2021-05-13 | End: 2021-05-13

## 2021-05-13 RX ORDER — ACETAMINOPHEN 500 MG
1000 TABLET ORAL ONCE
Status: COMPLETED | OUTPATIENT
Start: 2021-05-13 | End: 2021-05-13

## 2021-05-13 RX ORDER — DEXAMETHASONE SODIUM PHOSPHATE 4 MG/ML
INJECTION, SOLUTION INTRA-ARTICULAR; INTRALESIONAL; INTRAMUSCULAR; INTRAVENOUS; SOFT TISSUE AS NEEDED
Status: DISCONTINUED | OUTPATIENT
Start: 2021-05-13 | End: 2021-05-13 | Stop reason: SURG

## 2021-05-13 RX ORDER — BUPIVACAINE HCL/0.9 % NACL/PF 0.125 %
PLASTIC BAG, INJECTION (ML) EPIDURAL AS NEEDED
Status: DISCONTINUED | OUTPATIENT
Start: 2021-05-13 | End: 2021-05-13 | Stop reason: SURG

## 2021-05-13 RX ORDER — SODIUM CHLORIDE 0.9 % (FLUSH) 0.9 %
10 SYRINGE (ML) INJECTION AS NEEDED
Status: DISCONTINUED | OUTPATIENT
Start: 2021-05-13 | End: 2021-05-13 | Stop reason: HOSPADM

## 2021-05-13 RX ORDER — PREGABALIN 75 MG/1
75 CAPSULE ORAL ONCE
Status: COMPLETED | OUTPATIENT
Start: 2021-05-13 | End: 2021-05-13

## 2021-05-13 RX ORDER — MIDAZOLAM HYDROCHLORIDE 1 MG/ML
1 INJECTION INTRAMUSCULAR; INTRAVENOUS
Status: DISCONTINUED | OUTPATIENT
Start: 2021-05-13 | End: 2021-05-13 | Stop reason: HOSPADM

## 2021-05-13 RX ORDER — HYDROMORPHONE HYDROCHLORIDE 1 MG/ML
0.5 INJECTION, SOLUTION INTRAMUSCULAR; INTRAVENOUS; SUBCUTANEOUS
Status: CANCELLED | OUTPATIENT
Start: 2021-05-13 | End: 2021-05-23

## 2021-05-13 RX ORDER — FAMOTIDINE 20 MG/1
20 TABLET, FILM COATED ORAL ONCE
Status: COMPLETED | OUTPATIENT
Start: 2021-05-13 | End: 2021-05-13

## 2021-05-13 RX ADMIN — CEFAZOLIN SODIUM 2 G: 2 INJECTION, SOLUTION INTRAVENOUS at 14:50

## 2021-05-13 RX ADMIN — SODIUM CHLORIDE, POTASSIUM CHLORIDE, SODIUM LACTATE AND CALCIUM CHLORIDE 9 ML/HR: 600; 310; 30; 20 INJECTION, SOLUTION INTRAVENOUS at 13:33

## 2021-05-13 RX ADMIN — PROPOFOL 200 MG: 10 INJECTION, EMULSION INTRAVENOUS at 14:55

## 2021-05-13 RX ADMIN — PREGABALIN 75 MG: 75 CAPSULE ORAL at 13:33

## 2021-05-13 RX ADMIN — DEXAMETHASONE SODIUM PHOSPHATE 8 MG: 4 INJECTION, SOLUTION INTRA-ARTICULAR; INTRALESIONAL; INTRAMUSCULAR; INTRAVENOUS; SOFT TISSUE at 15:05

## 2021-05-13 RX ADMIN — BUPIVACAINE HYDROCHLORIDE 10 ML: 2.5 INJECTION, SOLUTION EPIDURAL; INFILTRATION; INTRACAUDAL at 14:22

## 2021-05-13 RX ADMIN — ALBUTEROL SULFATE 2.5 MG: 2.5 SOLUTION RESPIRATORY (INHALATION) at 13:42

## 2021-05-13 RX ADMIN — FAMOTIDINE 20 MG: 20 TABLET ORAL at 13:33

## 2021-05-13 RX ADMIN — LIDOCAINE HYDROCHLORIDE 0.5 ML: 10 INJECTION, SOLUTION EPIDURAL; INFILTRATION; INTRACAUDAL; PERINEURAL at 13:33

## 2021-05-13 RX ADMIN — ROCURONIUM BROMIDE 10 MG: 10 INJECTION INTRAVENOUS at 15:36

## 2021-05-13 RX ADMIN — Medication 100 MCG: at 15:45

## 2021-05-13 RX ADMIN — GLYCOPYRROLATE 0.4 MG: 0.4 INJECTION INTRAMUSCULAR; INTRAVENOUS at 16:09

## 2021-05-13 RX ADMIN — ROPIVACAINE HYDROCHLORIDE 6 ML/HR: 2 INJECTION, SOLUTION EPIDURAL; INFILTRATION at 16:24

## 2021-05-13 RX ADMIN — NEOSTIGMINE 3 MG: 1 INJECTION INTRAVENOUS at 16:09

## 2021-05-13 RX ADMIN — ROCURONIUM BROMIDE 40 MG: 10 INJECTION INTRAVENOUS at 14:55

## 2021-05-13 RX ADMIN — LIDOCAINE HYDROCHLORIDE 50 MG: 10 INJECTION, SOLUTION INFILTRATION; PERINEURAL at 14:55

## 2021-05-13 RX ADMIN — ACETAMINOPHEN 1000 MG: 500 TABLET ORAL at 13:49

## 2021-05-13 RX ADMIN — ESMOLOL HYDROCHLORIDE 30 MG: 10 INJECTION, SOLUTION INTRAVENOUS at 15:36

## 2021-05-13 RX ADMIN — ONDANSETRON 4 MG: 2 INJECTION INTRAMUSCULAR; INTRAVENOUS at 16:01

## 2021-09-15 ENCOUNTER — APPOINTMENT (OUTPATIENT)
Dept: WOMENS IMAGING | Facility: HOSPITAL | Age: 56
End: 2021-09-15

## 2021-09-15 PROCEDURE — 77063 BREAST TOMOSYNTHESIS BI: CPT | Performed by: RADIOLOGY

## 2021-09-15 PROCEDURE — 77067 SCR MAMMO BI INCL CAD: CPT | Performed by: RADIOLOGY

## 2021-10-11 ENCOUNTER — OFFICE VISIT (OUTPATIENT)
Dept: CARDIOLOGY | Facility: CLINIC | Age: 56
End: 2021-10-11

## 2021-10-11 VITALS
TEMPERATURE: 97.2 F | SYSTOLIC BLOOD PRESSURE: 120 MMHG | BODY MASS INDEX: 33.68 KG/M2 | HEART RATE: 78 BPM | WEIGHT: 209.6 LBS | DIASTOLIC BLOOD PRESSURE: 60 MMHG | HEIGHT: 66 IN

## 2021-10-11 DIAGNOSIS — I10 ESSENTIAL HYPERTENSION: Primary | ICD-10-CM

## 2021-10-11 DIAGNOSIS — J43.9 PULMONARY EMPHYSEMA, UNSPECIFIED EMPHYSEMA TYPE (HCC): ICD-10-CM

## 2021-10-11 DIAGNOSIS — U09.9 POST-COVID-19 SYNDROME: ICD-10-CM

## 2021-10-11 DIAGNOSIS — R43.2 LOSS OF TASTE: ICD-10-CM

## 2021-10-11 DIAGNOSIS — R53.83 OTHER FATIGUE: ICD-10-CM

## 2021-10-11 DIAGNOSIS — R06.02 SHORTNESS OF BREATH: ICD-10-CM

## 2021-10-11 DIAGNOSIS — E66.9 OBESITY WITH BODY MASS INDEX OF 30.0-39.9: ICD-10-CM

## 2021-10-11 DIAGNOSIS — E78.2 MIXED HYPERLIPIDEMIA: ICD-10-CM

## 2021-10-11 PROCEDURE — 99214 OFFICE O/P EST MOD 30 MIN: CPT | Performed by: NURSE PRACTITIONER

## 2021-11-10 ENCOUNTER — HOSPITAL ENCOUNTER (OUTPATIENT)
Dept: CARDIOLOGY | Facility: HOSPITAL | Age: 56
Discharge: HOME | End: 2021-11-10
Admitting: NURSE PRACTITIONER

## 2021-11-10 VITALS — BODY MASS INDEX: 33.69 KG/M2 | WEIGHT: 209.66 LBS | HEIGHT: 66 IN

## 2021-11-10 DIAGNOSIS — R06.02 SHORTNESS OF BREATH: ICD-10-CM

## 2021-11-10 DIAGNOSIS — U09.9 POST-COVID-19 SYNDROME: ICD-10-CM

## 2021-11-10 DIAGNOSIS — R53.83 OTHER FATIGUE: ICD-10-CM

## 2021-11-10 LAB
BH CV ECHO MEAS - AO MAX PG (FULL): 1.4 MMHG
BH CV ECHO MEAS - AO MAX PG: 4.8 MMHG
BH CV ECHO MEAS - AO MEAN PG (FULL): 1 MMHG
BH CV ECHO MEAS - AO MEAN PG: 3 MMHG
BH CV ECHO MEAS - AO ROOT AREA (BSA CORRECTED): 1.2
BH CV ECHO MEAS - AO ROOT AREA: 4.9 CM^2
BH CV ECHO MEAS - AO ROOT DIAM: 2.5 CM
BH CV ECHO MEAS - AO V2 MAX: 110 CM/SEC
BH CV ECHO MEAS - AO V2 MEAN: 75.1 CM/SEC
BH CV ECHO MEAS - AO V2 VTI: 24.3 CM
BH CV ECHO MEAS - BSA(HAYCOCK): 2.1 M^2
BH CV ECHO MEAS - BSA: 2 M^2
BH CV ECHO MEAS - BZI_BMI: 33.7 KILOGRAMS/M^2
BH CV ECHO MEAS - BZI_METRIC_HEIGHT: 167.6 CM
BH CV ECHO MEAS - BZI_METRIC_WEIGHT: 94.8 KG
BH CV ECHO MEAS - EDV(CUBED): 75.7 ML
BH CV ECHO MEAS - EDV(TEICH): 79.9 ML
BH CV ECHO MEAS - EF(CUBED): 65.4 %
BH CV ECHO MEAS - EF(MOD-BP): 57 %
BH CV ECHO MEAS - EF(TEICH): 57.3 %
BH CV ECHO MEAS - ESV(CUBED): 26.2 ML
BH CV ECHO MEAS - ESV(TEICH): 34.2 ML
BH CV ECHO MEAS - FS: 29.8 %
BH CV ECHO MEAS - IVS/LVPW: 0.94
BH CV ECHO MEAS - IVSD: 1.1 CM
BH CV ECHO MEAS - LA DIMENSION: 3.7 CM
BH CV ECHO MEAS - LA/AO: 1.5
BH CV ECHO MEAS - LAT PEAK E' VEL: 7 CM/SEC
BH CV ECHO MEAS - LV IVRT: 0.12 SEC
BH CV ECHO MEAS - LV MASS(C)D: 159.8 GRAMS
BH CV ECHO MEAS - LV MASS(C)DI: 78.4 GRAMS/M^2
BH CV ECHO MEAS - LV MAX PG: 3.5 MMHG
BH CV ECHO MEAS - LV MEAN PG: 2 MMHG
BH CV ECHO MEAS - LV V1 MAX: 93.3 CM/SEC
BH CV ECHO MEAS - LV V1 MEAN: 57.4 CM/SEC
BH CV ECHO MEAS - LV V1 VTI: 17.1 CM
BH CV ECHO MEAS - LVIDD: 4.2 CM
BH CV ECHO MEAS - LVIDS: 3 CM
BH CV ECHO MEAS - LVPWD: 1.1 CM
BH CV ECHO MEAS - MED PEAK E' VEL: 8.5 CM/SEC
BH CV ECHO MEAS - MV A MAX VEL: 63.1 CM/SEC
BH CV ECHO MEAS - MV DEC SLOPE: 619 CM/SEC^2
BH CV ECHO MEAS - MV DEC TIME: 0.15 SEC
BH CV ECHO MEAS - MV E MAX VEL: 53.7 CM/SEC
BH CV ECHO MEAS - MV E/A: 0.85
BH CV ECHO MEAS - MV MAX PG: 2.9 MMHG
BH CV ECHO MEAS - MV MEAN PG: 1 MMHG
BH CV ECHO MEAS - MV P1/2T MAX VEL: 90.8 CM/SEC
BH CV ECHO MEAS - MV P1/2T: 43 MSEC
BH CV ECHO MEAS - MV V2 MAX: 84.6 CM/SEC
BH CV ECHO MEAS - MV V2 MEAN: 57.1 CM/SEC
BH CV ECHO MEAS - MV V2 VTI: 22.3 CM
BH CV ECHO MEAS - MVA P1/2T LCG: 2.4 CM^2
BH CV ECHO MEAS - MVA(P1/2T): 5.1 CM^2
BH CV ECHO MEAS - PA MAX PG (FULL): 1.5 MMHG
BH CV ECHO MEAS - PA MAX PG: 4.7 MMHG
BH CV ECHO MEAS - PA MEAN PG (FULL): 1 MMHG
BH CV ECHO MEAS - PA MEAN PG: 3 MMHG
BH CV ECHO MEAS - PA V2 MAX: 108 CM/SEC
BH CV ECHO MEAS - PA V2 MEAN: 79.4 CM/SEC
BH CV ECHO MEAS - PA V2 VTI: 23.4 CM
BH CV ECHO MEAS - RAP SYSTOLE: 10 MMHG
BH CV ECHO MEAS - RV MAX PG: 3.2 MMHG
BH CV ECHO MEAS - RV MEAN PG: 2 MMHG
BH CV ECHO MEAS - RV V1 MAX: 88.8 CM/SEC
BH CV ECHO MEAS - RV V1 MEAN: 55.8 CM/SEC
BH CV ECHO MEAS - RV V1 VTI: 16.7 CM
BH CV ECHO MEAS - RVDD: 3.1 CM
BH CV ECHO MEAS - RVSP: 15 MMHG
BH CV ECHO MEAS - SI(AO): 58.5 ML/M^2
BH CV ECHO MEAS - SI(CUBED): 24.3 ML/M^2
BH CV ECHO MEAS - SI(TEICH): 22.5 ML/M^2
BH CV ECHO MEAS - SV(AO): 119.3 ML
BH CV ECHO MEAS - SV(CUBED): 49.5 ML
BH CV ECHO MEAS - SV(TEICH): 45.8 ML
BH CV ECHO MEAS - TR MAX VEL: 107 CM/SEC
BH CV ECHO MEASUREMENTS AVERAGE E/E' RATIO: 6.93
MAXIMAL PREDICTED HEART RATE: 162 BPM
SINUS: 2.4 CM
STRESS TARGET HR: 138 BPM

## 2021-11-10 PROCEDURE — 93306 TTE W/DOPPLER COMPLETE: CPT | Performed by: INTERNAL MEDICINE

## 2021-11-10 PROCEDURE — 93306 TTE W/DOPPLER COMPLETE: CPT

## 2021-11-12 RX ORDER — INDOMETHACIN 75 MG/1
75 CAPSULE, EXTENDED RELEASE ORAL 2 TIMES DAILY WITH MEALS
COMMUNITY
End: 2021-11-12 | Stop reason: SDUPTHER

## 2021-11-15 RX ORDER — INDOMETHACIN 75 MG/1
CAPSULE, EXTENDED RELEASE ORAL
Qty: 42 CAPSULE | Refills: 0
Start: 2021-11-15 | End: 2021-11-16 | Stop reason: SDUPTHER

## 2021-11-17 RX ORDER — INDOMETHACIN 75 MG/1
CAPSULE, EXTENDED RELEASE ORAL
Qty: 42 CAPSULE | Refills: 0 | Status: SHIPPED | OUTPATIENT
Start: 2021-11-17 | End: 2021-11-22

## 2021-11-22 RX ORDER — INDOMETHACIN 75 MG/1
CAPSULE, EXTENDED RELEASE ORAL
Qty: 42 CAPSULE | Refills: 0 | Status: SHIPPED | OUTPATIENT
Start: 2021-11-22 | End: 2022-01-11 | Stop reason: SDUPTHER

## 2021-12-06 ENCOUNTER — TELEPHONE (OUTPATIENT)
Dept: CARDIOLOGY | Facility: CLINIC | Age: 56
End: 2021-12-06

## 2022-01-11 RX ORDER — INDOMETHACIN 75 MG/1
75 CAPSULE, EXTENDED RELEASE ORAL
Qty: 30 CAPSULE | Refills: 6 | Status: SHIPPED | OUTPATIENT
Start: 2022-01-11 | End: 2022-05-24 | Stop reason: SDUPTHER

## 2022-05-16 DIAGNOSIS — I10 ESSENTIAL HYPERTENSION: ICD-10-CM

## 2022-05-24 ENCOUNTER — OFFICE VISIT (OUTPATIENT)
Dept: CARDIOLOGY | Facility: CLINIC | Age: 57
End: 2022-05-24

## 2022-05-24 VITALS
HEART RATE: 70 BPM | DIASTOLIC BLOOD PRESSURE: 72 MMHG | BODY MASS INDEX: 31.4 KG/M2 | SYSTOLIC BLOOD PRESSURE: 130 MMHG | WEIGHT: 195.4 LBS | HEIGHT: 66 IN

## 2022-05-24 DIAGNOSIS — R60.1 GENERALIZED EDEMA: ICD-10-CM

## 2022-05-24 DIAGNOSIS — I10 ESSENTIAL HYPERTENSION: Primary | ICD-10-CM

## 2022-05-24 DIAGNOSIS — J43.9 PULMONARY EMPHYSEMA, UNSPECIFIED EMPHYSEMA TYPE: ICD-10-CM

## 2022-05-24 DIAGNOSIS — E78.2 MIXED HYPERLIPIDEMIA: ICD-10-CM

## 2022-05-24 DIAGNOSIS — R06.02 SHORTNESS OF BREATH: ICD-10-CM

## 2022-05-24 DIAGNOSIS — I31.39 PERICARDIAL EFFUSION: ICD-10-CM

## 2022-05-24 PROCEDURE — 99214 OFFICE O/P EST MOD 30 MIN: CPT | Performed by: NURSE PRACTITIONER

## 2022-05-24 RX ORDER — INDOMETHACIN 75 MG/1
75 CAPSULE, EXTENDED RELEASE ORAL
Qty: 30 CAPSULE | Refills: 8 | Status: SHIPPED | OUTPATIENT
Start: 2022-05-24 | End: 2022-12-14 | Stop reason: SDUPTHER

## 2022-05-24 RX ORDER — BUDESONIDE AND FORMOTEROL FUMARATE DIHYDRATE 160; 4.5 UG/1; UG/1
2 AEROSOL RESPIRATORY (INHALATION)
COMMUNITY
End: 2022-12-14

## 2022-05-24 RX ORDER — FUROSEMIDE 20 MG/1
20 TABLET ORAL DAILY
Qty: 30 TABLET | Refills: 8 | Status: SHIPPED | OUTPATIENT
Start: 2022-05-24 | End: 2022-12-14

## 2022-05-24 RX ORDER — DILTIAZEM HYDROCHLORIDE 240 MG/1
CAPSULE, COATED, EXTENDED RELEASE ORAL
Qty: 90 CAPSULE | Refills: 3 | Status: SHIPPED | OUTPATIENT
Start: 2022-05-24

## 2022-10-06 ENCOUNTER — APPOINTMENT (OUTPATIENT)
Dept: WOMENS IMAGING | Facility: HOSPITAL | Age: 57
End: 2022-10-06

## 2022-10-06 PROCEDURE — 77063 BREAST TOMOSYNTHESIS BI: CPT | Performed by: RADIOLOGY

## 2022-10-06 PROCEDURE — 77067 SCR MAMMO BI INCL CAD: CPT | Performed by: RADIOLOGY

## 2022-12-14 ENCOUNTER — OFFICE VISIT (OUTPATIENT)
Dept: CARDIOLOGY | Facility: CLINIC | Age: 57
End: 2022-12-14

## 2022-12-14 VITALS
DIASTOLIC BLOOD PRESSURE: 70 MMHG | HEART RATE: 70 BPM | BODY MASS INDEX: 28.54 KG/M2 | HEIGHT: 66 IN | SYSTOLIC BLOOD PRESSURE: 120 MMHG | WEIGHT: 177.6 LBS

## 2022-12-14 DIAGNOSIS — I10 ESSENTIAL HYPERTENSION: Primary | ICD-10-CM

## 2022-12-14 DIAGNOSIS — R06.02 SHORTNESS OF BREATH: ICD-10-CM

## 2022-12-14 DIAGNOSIS — E78.2 MIXED HYPERLIPIDEMIA: ICD-10-CM

## 2022-12-14 DIAGNOSIS — I31.39 PERICARDIAL EFFUSION: ICD-10-CM

## 2022-12-14 DIAGNOSIS — J43.9 PULMONARY EMPHYSEMA, UNSPECIFIED EMPHYSEMA TYPE: ICD-10-CM

## 2022-12-14 DIAGNOSIS — R60.1 GENERALIZED EDEMA: ICD-10-CM

## 2022-12-14 PROCEDURE — 99213 OFFICE O/P EST LOW 20 MIN: CPT | Performed by: NURSE PRACTITIONER

## 2022-12-14 RX ORDER — FUROSEMIDE 20 MG/1
20 TABLET ORAL DAILY
Qty: 30 TABLET | Refills: 8
Start: 2022-12-14

## 2022-12-14 RX ORDER — BUDESONIDE, GLYCOPYRROLATE, AND FORMOTEROL FUMARATE 160; 9; 4.8 UG/1; UG/1; UG/1
2 AEROSOL, METERED RESPIRATORY (INHALATION) 2 TIMES DAILY
COMMUNITY

## 2022-12-14 RX ORDER — INDOMETHACIN 75 MG/1
75 CAPSULE, EXTENDED RELEASE ORAL
Qty: 90 CAPSULE | Refills: 3 | Status: SHIPPED | OUTPATIENT
Start: 2022-12-14 | End: 2023-01-30

## 2022-12-14 RX ORDER — FENOFIBRATE 145 MG/1
145 TABLET, COATED ORAL DAILY
Qty: 90 TABLET | Refills: 3 | Status: SHIPPED | OUTPATIENT
Start: 2022-12-14

## 2023-01-24 ENCOUNTER — TELEPHONE (OUTPATIENT)
Dept: CARDIOLOGY | Facility: CLINIC | Age: 58
End: 2023-01-24

## 2023-01-25 DIAGNOSIS — I31.39 PERICARDIAL EFFUSION: Primary | ICD-10-CM

## 2023-01-27 ENCOUNTER — HOSPITAL ENCOUNTER (OUTPATIENT)
Dept: CARDIOLOGY | Facility: HOSPITAL | Age: 58
Discharge: HOME | End: 2023-01-27
Admitting: NURSE PRACTITIONER
Payer: COMMERCIAL

## 2023-01-27 VITALS — WEIGHT: 177.69 LBS | BODY MASS INDEX: 28.56 KG/M2 | HEIGHT: 66 IN

## 2023-01-27 DIAGNOSIS — I31.39 PERICARDIAL EFFUSION: ICD-10-CM

## 2023-01-27 PROCEDURE — 93306 TTE W/DOPPLER COMPLETE: CPT

## 2023-01-27 PROCEDURE — 93306 TTE W/DOPPLER COMPLETE: CPT | Performed by: INTERNAL MEDICINE

## 2023-01-28 LAB
AORTIC DIMENSIONLESS INDEX: 0.56 (DI)
BH CV ECHO MEAS - AO MAX PG: 16.1 MMHG
BH CV ECHO MEAS - AO MEAN PG: 7.5 MMHG
BH CV ECHO MEAS - AO ROOT DIAM: 2.33 CM
BH CV ECHO MEAS - AO V2 MAX: 200.9 CM/SEC
BH CV ECHO MEAS - AO V2 VTI: 42.5 CM
BH CV ECHO MEAS - EDV(CUBED): 118.9 ML
BH CV ECHO MEAS - EF_3D-VOL: 57 %
BH CV ECHO MEAS - ESV(CUBED): 35.6 ML
BH CV ECHO MEAS - FS: 33.1 %
BH CV ECHO MEAS - IVS/LVPW: 0.8 CM
BH CV ECHO MEAS - IVSD: 0.78 CM
BH CV ECHO MEAS - LA DIMENSION: 3.9 CM
BH CV ECHO MEAS - LAT PEAK E' VEL: 8.7 CM/SEC
BH CV ECHO MEAS - LV MASS(C)D: 148.8 GRAMS
BH CV ECHO MEAS - LV MAX PG: 5.1 MMHG
BH CV ECHO MEAS - LV MEAN PG: 1.97 MMHG
BH CV ECHO MEAS - LV V1 MAX: 112.5 CM/SEC
BH CV ECHO MEAS - LV V1 VTI: 25.4 CM
BH CV ECHO MEAS - LVIDD: 4.9 CM
BH CV ECHO MEAS - LVIDS: 3.3 CM
BH CV ECHO MEAS - LVPWD: 0.97 CM
BH CV ECHO MEAS - MED PEAK E' VEL: 11.1 CM/SEC
BH CV ECHO MEAS - MV A MAX VEL: 92.4 CM/SEC
BH CV ECHO MEAS - MV DEC SLOPE: 611.8 CM/SEC2
BH CV ECHO MEAS - MV DEC TIME: 0.22 MSEC
BH CV ECHO MEAS - MV E MAX VEL: 103 CM/SEC
BH CV ECHO MEAS - MV E/A: 1.11
BH CV ECHO MEAS - MV MAX PG: 4.4 MMHG
BH CV ECHO MEAS - MV MEAN PG: 2.28 MMHG
BH CV ECHO MEAS - MV P1/2T: 49.5 MSEC
BH CV ECHO MEAS - MV V2 VTI: 24.6 CM
BH CV ECHO MEAS - MVA(P1/2T): 4.4 CM2
BH CV ECHO MEAS - PA V2 MAX: 106.3 CM/SEC
BH CV ECHO MEAS - RAP SYSTOLE: 8 MMHG
BH CV ECHO MEAS - RV MAX PG: 3.4 MMHG
BH CV ECHO MEAS - RV V1 MAX: 91.8 CM/SEC
BH CV ECHO MEAS - RV V1 VTI: 19.9 CM
BH CV ECHO MEAS - RVDD: 3 CM
BH CV ECHO MEAS - RVSP: 17.2 MMHG
BH CV ECHO MEAS - TAPSE (>1.6): 1.93 CM
BH CV ECHO MEAS - TR MAX PG: 9.2 MMHG
BH CV ECHO MEAS - TR MAX VEL: 151.7 CM/SEC
BH CV ECHO MEASUREMENTS AVERAGE E/E' RATIO: 10.4
BH CV XLRA - TDI S': 13.5 CM/SEC
MAXIMAL PREDICTED HEART RATE: 161 BPM
SINUS: 2.34 CM
STRESS TARGET HR: 137 BPM

## 2023-01-30 DIAGNOSIS — I31.39 PERICARDIAL EFFUSION: Primary | ICD-10-CM

## 2023-01-30 DIAGNOSIS — Z79.899 MEDICATION MANAGEMENT: ICD-10-CM

## 2023-01-30 RX ORDER — COLCHICINE 0.6 MG/1
0.6 TABLET ORAL DAILY
Qty: 30 TABLET | Refills: 3 | Status: SHIPPED | OUTPATIENT
Start: 2023-01-30

## 2023-02-02 ENCOUNTER — TELEPHONE (OUTPATIENT)
Dept: CARDIOLOGY | Facility: CLINIC | Age: 58
End: 2023-02-02
Payer: COMMERCIAL

## 2023-02-03 DIAGNOSIS — I31.39 PERICARDIAL EFFUSION: Primary | ICD-10-CM

## 2023-02-03 DIAGNOSIS — I31.39 IDIOPATHIC PERICARDIAL EFFUSION: ICD-10-CM

## 2023-03-07 ENCOUNTER — TELEPHONE (OUTPATIENT)
Dept: CARDIOLOGY | Facility: CLINIC | Age: 58
End: 2023-03-07

## 2023-04-05 PROCEDURE — 93321 DOPPLER ECHO F-UP/LMTD STD: CPT | Performed by: INTERNAL MEDICINE

## 2023-04-05 PROCEDURE — 93308 TTE F-UP OR LMTD: CPT | Performed by: INTERNAL MEDICINE

## 2023-04-05 PROCEDURE — 93325 DOPPLER ECHO COLOR FLOW MAPG: CPT | Performed by: INTERNAL MEDICINE

## 2023-04-10 ENCOUNTER — OUTSIDE FACILITY SERVICE (OUTPATIENT)
Dept: CARDIOLOGY | Facility: CLINIC | Age: 58
End: 2023-04-10
Payer: COMMERCIAL

## 2023-04-28 ENCOUNTER — DOCUMENTATION (OUTPATIENT)
Dept: CARDIOLOGY | Facility: CLINIC | Age: 58
End: 2023-04-28
Payer: COMMERCIAL

## 2023-06-02 DIAGNOSIS — I10 ESSENTIAL HYPERTENSION: ICD-10-CM

## 2023-06-05 RX ORDER — DILTIAZEM HYDROCHLORIDE 240 MG/1
CAPSULE, COATED, EXTENDED RELEASE ORAL
Qty: 90 CAPSULE | Refills: 3 | Status: SHIPPED | OUTPATIENT
Start: 2023-06-05

## 2023-08-07 DIAGNOSIS — I31.39 PERICARDIAL EFFUSION: ICD-10-CM

## 2023-08-07 RX ORDER — COLCHICINE 0.6 MG/1
TABLET ORAL
Qty: 90 TABLET | Refills: 1 | OUTPATIENT
Start: 2023-08-07

## 2023-12-21 ENCOUNTER — OFFICE VISIT (OUTPATIENT)
Dept: CARDIOLOGY | Facility: CLINIC | Age: 58
End: 2023-12-21
Payer: COMMERCIAL

## 2023-12-21 VITALS
BODY MASS INDEX: 27.9 KG/M2 | HEIGHT: 66 IN | WEIGHT: 173.6 LBS | HEART RATE: 70 BPM | DIASTOLIC BLOOD PRESSURE: 70 MMHG | SYSTOLIC BLOOD PRESSURE: 134 MMHG

## 2023-12-21 DIAGNOSIS — E78.2 MIXED HYPERLIPIDEMIA: ICD-10-CM

## 2023-12-21 DIAGNOSIS — I25.10 CORONARY ARTERY DISEASE INVOLVING NATIVE CORONARY ARTERY OF NATIVE HEART WITHOUT ANGINA PECTORIS: Primary | ICD-10-CM

## 2023-12-21 DIAGNOSIS — J43.9 PULMONARY EMPHYSEMA, UNSPECIFIED EMPHYSEMA TYPE: ICD-10-CM

## 2023-12-21 DIAGNOSIS — I10 ESSENTIAL HYPERTENSION: ICD-10-CM

## 2023-12-21 DIAGNOSIS — R60.1 GENERALIZED EDEMA: ICD-10-CM

## 2023-12-21 DIAGNOSIS — I31.39 PERICARDIAL EFFUSION: ICD-10-CM

## 2023-12-21 PROCEDURE — 99214 OFFICE O/P EST MOD 30 MIN: CPT | Performed by: NURSE PRACTITIONER

## 2023-12-21 RX ORDER — FUROSEMIDE 20 MG/1
20 TABLET ORAL DAILY
Qty: 90 TABLET | Refills: 3 | Status: SHIPPED | OUTPATIENT
Start: 2023-12-21

## 2023-12-21 RX ORDER — FENOFIBRATE 145 MG/1
145 TABLET, COATED ORAL DAILY
Qty: 90 TABLET | Refills: 3 | Status: SHIPPED | OUTPATIENT
Start: 2023-12-21

## 2024-06-07 DIAGNOSIS — I10 ESSENTIAL HYPERTENSION: ICD-10-CM

## 2024-06-10 RX ORDER — DILTIAZEM HYDROCHLORIDE 240 MG/1
240 CAPSULE, COATED, EXTENDED RELEASE ORAL DAILY
Qty: 30 CAPSULE | Refills: 0 | Status: SHIPPED | OUTPATIENT
Start: 2024-06-10

## 2024-07-02 ENCOUNTER — OFFICE VISIT (OUTPATIENT)
Dept: CARDIOLOGY | Facility: CLINIC | Age: 59
End: 2024-07-02
Payer: COMMERCIAL

## 2024-07-02 VITALS
DIASTOLIC BLOOD PRESSURE: 78 MMHG | WEIGHT: 154.2 LBS | SYSTOLIC BLOOD PRESSURE: 152 MMHG | HEART RATE: 82 BPM | BODY MASS INDEX: 24.78 KG/M2 | HEIGHT: 66 IN

## 2024-07-02 DIAGNOSIS — I10 ESSENTIAL HYPERTENSION: ICD-10-CM

## 2024-07-02 DIAGNOSIS — R60.1 GENERALIZED EDEMA: ICD-10-CM

## 2024-07-02 DIAGNOSIS — I31.39 PERICARDIAL EFFUSION: ICD-10-CM

## 2024-07-02 DIAGNOSIS — I25.10 CORONARY ARTERY DISEASE INVOLVING NATIVE CORONARY ARTERY OF NATIVE HEART WITHOUT ANGINA PECTORIS: Primary | ICD-10-CM

## 2024-07-02 DIAGNOSIS — E78.2 MIXED HYPERLIPIDEMIA: ICD-10-CM

## 2024-07-02 PROCEDURE — 99214 OFFICE O/P EST MOD 30 MIN: CPT | Performed by: NURSE PRACTITIONER

## 2024-07-02 RX ORDER — INDOMETHACIN 75 MG/1
75 CAPSULE, EXTENDED RELEASE ORAL DAILY
Qty: 90 CAPSULE | Refills: 3 | Status: SHIPPED | OUTPATIENT
Start: 2024-07-02

## 2024-07-02 RX ORDER — FUROSEMIDE 20 MG/1
20 TABLET ORAL DAILY
Qty: 90 TABLET | Refills: 3 | Status: SHIPPED | OUTPATIENT
Start: 2024-07-02

## 2024-08-08 ENCOUNTER — TELEPHONE (OUTPATIENT)
Dept: CARDIOLOGY | Facility: CLINIC | Age: 59
End: 2024-08-08

## 2024-12-13 ENCOUNTER — TELEPHONE (OUTPATIENT)
Dept: CARDIOLOGY | Facility: CLINIC | Age: 59
End: 2024-12-13
Payer: COMMERCIAL

## 2024-12-13 NOTE — TELEPHONE ENCOUNTER
Received fax from Dr. Maki for cardiac clearance for patient to have a right reverse total shoulder surgery. According to our records, I do not see where patient is on any blood thinners or has had any stenting.           Fax [phone]  Attn: Vijaya Pat

## 2024-12-18 ENCOUNTER — PRE-ADMISSION TESTING (OUTPATIENT)
Dept: PREADMISSION TESTING | Facility: HOSPITAL | Age: 59
End: 2024-12-18
Payer: COMMERCIAL

## 2024-12-18 VITALS — WEIGHT: 164.79 LBS | BODY MASS INDEX: 26.48 KG/M2 | HEIGHT: 66 IN

## 2024-12-18 LAB
ALBUMIN SERPL-MCNC: 3.8 G/DL (ref 3.5–5.2)
ALBUMIN/GLOB SERPL: 1.7 G/DL
ALP SERPL-CCNC: 51 U/L (ref 39–117)
ALT SERPL W P-5'-P-CCNC: 20 U/L (ref 1–33)
ANION GAP SERPL CALCULATED.3IONS-SCNC: 9 MMOL/L (ref 5–15)
APTT PPP: 24.1 SECONDS (ref 22–39)
AST SERPL-CCNC: 26 U/L (ref 1–32)
BASOPHILS # BLD AUTO: 0.07 10*3/MM3 (ref 0–0.2)
BASOPHILS NFR BLD AUTO: 0.7 % (ref 0–1.5)
BILIRUB SERPL-MCNC: 0.3 MG/DL (ref 0–1.2)
BUN SERPL-MCNC: 14 MG/DL (ref 8–23)
BUN/CREAT SERPL: 19.4 (ref 7–25)
CALCIUM SPEC-SCNC: 9.3 MG/DL (ref 8.6–10.5)
CHLORIDE SERPL-SCNC: 107 MMOL/L (ref 98–107)
CO2 SERPL-SCNC: 26 MMOL/L (ref 22–29)
CREAT SERPL-MCNC: 0.72 MG/DL (ref 0.57–1)
DEPRECATED RDW RBC AUTO: 44.5 FL (ref 37–54)
EGFRCR SERPLBLD CKD-EPI 2021: 95.3 ML/MIN/1.73
EOSINOPHIL # BLD AUTO: 0.15 10*3/MM3 (ref 0–0.4)
EOSINOPHIL NFR BLD AUTO: 1.6 % (ref 0.3–6.2)
ERYTHROCYTE [DISTWIDTH] IN BLOOD BY AUTOMATED COUNT: 13.6 % (ref 12.3–15.4)
GLOBULIN UR ELPH-MCNC: 2.2 GM/DL
GLUCOSE SERPL-MCNC: 99 MG/DL (ref 65–99)
HBA1C MFR BLD: 5 % (ref 4.8–5.6)
HCT VFR BLD AUTO: 37 % (ref 34–46.6)
HGB BLD-MCNC: 12.2 G/DL (ref 12–15.9)
IMM GRANULOCYTES # BLD AUTO: 0.04 10*3/MM3 (ref 0–0.05)
IMM GRANULOCYTES NFR BLD AUTO: 0.4 % (ref 0–0.5)
INR PPP: 0.89 (ref 0.89–1.12)
LYMPHOCYTES # BLD AUTO: 1.29 10*3/MM3 (ref 0.7–3.1)
LYMPHOCYTES NFR BLD AUTO: 13.6 % (ref 19.6–45.3)
MCH RBC QN AUTO: 29.5 PG (ref 26.6–33)
MCHC RBC AUTO-ENTMCNC: 33 G/DL (ref 31.5–35.7)
MCV RBC AUTO: 89.6 FL (ref 79–97)
MONOCYTES # BLD AUTO: 0.65 10*3/MM3 (ref 0.1–0.9)
MONOCYTES NFR BLD AUTO: 6.9 % (ref 5–12)
NEUTROPHILS NFR BLD AUTO: 7.28 10*3/MM3 (ref 1.7–7)
NEUTROPHILS NFR BLD AUTO: 76.8 % (ref 42.7–76)
NRBC BLD AUTO-RTO: 0 /100 WBC (ref 0–0.2)
PLATELET # BLD AUTO: 317 10*3/MM3 (ref 140–450)
PMV BLD AUTO: 9.7 FL (ref 6–12)
POTASSIUM SERPL-SCNC: 4.5 MMOL/L (ref 3.5–5.2)
PROT SERPL-MCNC: 6 G/DL (ref 6–8.5)
PROTHROMBIN TIME: 12.1 SECONDS (ref 12.2–14.5)
RBC # BLD AUTO: 4.13 10*6/MM3 (ref 3.77–5.28)
SODIUM SERPL-SCNC: 142 MMOL/L (ref 136–145)
WBC NRBC COR # BLD AUTO: 9.48 10*3/MM3 (ref 3.4–10.8)

## 2024-12-18 PROCEDURE — 85730 THROMBOPLASTIN TIME PARTIAL: CPT

## 2024-12-18 PROCEDURE — 83036 HEMOGLOBIN GLYCOSYLATED A1C: CPT

## 2024-12-18 PROCEDURE — 85610 PROTHROMBIN TIME: CPT

## 2024-12-18 PROCEDURE — 93005 ELECTROCARDIOGRAM TRACING: CPT

## 2024-12-18 PROCEDURE — 80053 COMPREHEN METABOLIC PANEL: CPT

## 2024-12-18 PROCEDURE — 36415 COLL VENOUS BLD VENIPUNCTURE: CPT

## 2024-12-18 PROCEDURE — 85025 COMPLETE CBC W/AUTO DIFF WBC: CPT

## 2024-12-18 RX ORDER — HYDROCODONE BITARTRATE AND ACETAMINOPHEN 5; 325 MG/1; MG/1
1 TABLET ORAL EVERY 8 HOURS PRN
COMMUNITY
Start: 2024-10-21

## 2024-12-18 NOTE — PAT
An arrival time for procedure was not provided during PAT visit. If patient had any questions or concerns about their arrival time, they were instructed to contact their surgeon/physician.  Additionally, if the patient referred to an arrival time that was acquired from their my chart account, patient was encouraged to verify that time with their surgeon/physician. Arrival times are NOT provided in Pre Admission Testing Department.    Patient instructed to drink 20 ounces of Gatorade or Gatorlyte (if diabetic) and it needs to be completed 1 hour (for Main OR patients) or 2 hours (scheduled  section & [facility] patients) before given arrival time for procedure (NO RED Gatorade and NO Gatorade Zero).    Patient verbalized understanding.    Post-Surgery Information Instruction Sheet given to patient during Pre-Admission Testing Visit with verbal instructions to patient to return with PAT PASS on the day of surgery. Additionally, encouraged patient to review the information provided.    [employer] (by [employer]) pain pump patient informational handout given to patient.  Instructed patient to watch [employer] Patient Education Video regarding Peripheral Nerve Catheter that will be in place for upcoming surgery unless contraindicated. The video can be accessed using QR code noted on handout.  Patient agreed to watch video.  Stressed to patient to call [employer] Nursing Hotline  if patient has any questions or concerns after discharge.     Patient denies any current skin issues.     PATIENT WAS GIVEN INSTRUCTIONS ON HOW TO AND WHEN TO USE BACTROBAN AND BENZOYL WASH. PATIENT VERBALIZED UNDERSTANDING.     PATIENT EKG ON CHART FROM 2024    PATIENT CARDIAC CLEARANCE ON CHART

## 2024-12-19 LAB
QT INTERVAL: 360 MS
QTC INTERVAL: 430 MS

## 2024-12-26 ENCOUNTER — ANESTHESIA EVENT (OUTPATIENT)
Dept: PERIOP | Facility: HOSPITAL | Age: 59
End: 2024-12-26
Payer: COMMERCIAL

## 2024-12-26 ENCOUNTER — ANESTHESIA EVENT CONVERTED (OUTPATIENT)
Dept: ANESTHESIOLOGY | Facility: HOSPITAL | Age: 59
End: 2024-12-26
Payer: COMMERCIAL

## 2024-12-26 ENCOUNTER — HOSPITAL ENCOUNTER (OUTPATIENT)
Facility: HOSPITAL | Age: 59
Discharge: HOME | End: 2024-12-27
Attending: ORTHOPAEDIC SURGERY | Admitting: ORTHOPAEDIC SURGERY
Payer: COMMERCIAL

## 2024-12-26 ENCOUNTER — APPOINTMENT (OUTPATIENT)
Dept: GENERAL RADIOLOGY | Facility: HOSPITAL | Age: 59
End: 2024-12-26
Payer: COMMERCIAL

## 2024-12-26 ENCOUNTER — ANESTHESIA (OUTPATIENT)
Dept: PERIOP | Facility: HOSPITAL | Age: 59
End: 2024-12-26
Payer: COMMERCIAL

## 2024-12-26 PROBLEM — M12.811 ROTATOR CUFF ARTHROPATHY OF RIGHT SHOULDER: Status: ACTIVE | Noted: 2024-12-26

## 2024-12-26 PROCEDURE — 25010000002 VANCOMYCIN 1 G RECONSTITUTED SOLUTION: Performed by: ORTHOPAEDIC SURGERY

## 2024-12-26 PROCEDURE — 97110 THERAPEUTIC EXERCISES: CPT

## 2024-12-26 PROCEDURE — 97535 SELF CARE MNGMENT TRAINING: CPT

## 2024-12-26 PROCEDURE — C1713 ANCHOR/SCREW BN/BN,TIS/BN: HCPCS | Performed by: ORTHOPAEDIC SURGERY

## 2024-12-26 PROCEDURE — C1776 JOINT DEVICE (IMPLANTABLE): HCPCS | Performed by: ORTHOPAEDIC SURGERY

## 2024-12-26 PROCEDURE — 25010000002 PROPOFOL 10 MG/ML EMULSION: Performed by: NURSE ANESTHETIST, CERTIFIED REGISTERED

## 2024-12-26 PROCEDURE — 25010000002 LIDOCAINE PF 1% 1 % SOLUTION: Performed by: NURSE ANESTHETIST, CERTIFIED REGISTERED

## 2024-12-26 PROCEDURE — 25810000003 LACTATED RINGERS PER 1000 ML: Performed by: ANESTHESIOLOGY

## 2024-12-26 PROCEDURE — 25810000003 SODIUM CHLORIDE 0.9 % SOLUTION 250 ML FLEX CONT: Performed by: ORTHOPAEDIC SURGERY

## 2024-12-26 PROCEDURE — 25010000002 DEXAMETHASONE PER 1 MG: Performed by: NURSE ANESTHETIST, CERTIFIED REGISTERED

## 2024-12-26 PROCEDURE — 25810000003 LACTATED RINGERS PER 1000 ML: Performed by: NURSE ANESTHETIST, CERTIFIED REGISTERED

## 2024-12-26 PROCEDURE — 25010000002 PHENYLEPHRINE 10 MG/ML SOLUTION 1 ML VIAL: Performed by: NURSE ANESTHETIST, CERTIFIED REGISTERED

## 2024-12-26 PROCEDURE — 25010000002 CEFAZOLIN PER 500 MG: Performed by: ORTHOPAEDIC SURGERY

## 2024-12-26 PROCEDURE — 25010000002 BUPIVACAINE (PF) 0.25 % SOLUTION: Performed by: NURSE ANESTHETIST, CERTIFIED REGISTERED

## 2024-12-26 PROCEDURE — 25810000003 SODIUM CHLORIDE 0.9 % SOLUTION 250 ML FLEX CONT: Performed by: NURSE ANESTHETIST, CERTIFIED REGISTERED

## 2024-12-26 PROCEDURE — 25010000002 ONDANSETRON PER 1 MG: Performed by: NURSE ANESTHETIST, CERTIFIED REGISTERED

## 2024-12-26 PROCEDURE — 97166 OT EVAL MOD COMPLEX 45 MIN: CPT

## 2024-12-26 PROCEDURE — L3670 SO ACRO/CLAV CAN WEB PRE OTS: HCPCS | Performed by: ORTHOPAEDIC SURGERY

## 2024-12-26 PROCEDURE — 97530 THERAPEUTIC ACTIVITIES: CPT

## 2024-12-26 PROCEDURE — 25010000002 SUGAMMADEX 500 MG/5ML SOLUTION: Performed by: NURSE ANESTHETIST, CERTIFIED REGISTERED

## 2024-12-26 PROCEDURE — 25010000002 LIDOCAINE PF 1% 1 % SOLUTION: Performed by: ANESTHESIOLOGY

## 2024-12-26 PROCEDURE — 25010000002 DEXAMETHASONE SODIUM PHOSPHATE 10 MG/ML SOLUTION: Performed by: NURSE ANESTHETIST, CERTIFIED REGISTERED

## 2024-12-26 PROCEDURE — 99221 1ST HOSP IP/OBS SF/LOW 40: CPT | Performed by: NURSE PRACTITIONER

## 2024-12-26 PROCEDURE — 73020 X-RAY EXAM OF SHOULDER: CPT

## 2024-12-26 PROCEDURE — 25010000002 ROPIVACAINE HCL-NACL 0.2-0.9 % SOLUTION: Performed by: NURSE ANESTHETIST, CERTIFIED REGISTERED

## 2024-12-26 PROCEDURE — 25010000002 FENTANYL CITRATE (PF) 50 MCG/ML SOLUTION: Performed by: NURSE ANESTHETIST, CERTIFIED REGISTERED

## 2024-12-26 PROCEDURE — 25010000002 VANCOMYCIN 1 G RECONSTITUTED SOLUTION 1 EACH VIAL: Performed by: ORTHOPAEDIC SURGERY

## 2024-12-26 RX ORDER — ONDANSETRON 2 MG/ML
4 INJECTION INTRAMUSCULAR; INTRAVENOUS ONCE AS NEEDED
Status: DISCONTINUED | OUTPATIENT
Start: 2024-12-26 | End: 2024-12-26 | Stop reason: HOSPADM

## 2024-12-26 RX ORDER — SODIUM CHLORIDE, SODIUM LACTATE, POTASSIUM CHLORIDE, CALCIUM CHLORIDE 600; 310; 30; 20 MG/100ML; MG/100ML; MG/100ML; MG/100ML
9 INJECTION, SOLUTION INTRAVENOUS ONCE
Status: COMPLETED | OUTPATIENT
Start: 2024-12-26 | End: 2024-12-26

## 2024-12-26 RX ORDER — ALBUTEROL SULFATE 0.83 MG/ML
2.5 SOLUTION RESPIRATORY (INHALATION) EVERY 4 HOURS PRN
Status: DISCONTINUED | OUTPATIENT
Start: 2024-12-26 | End: 2024-12-27 | Stop reason: HOSPADM

## 2024-12-26 RX ORDER — SODIUM CHLORIDE, SODIUM LACTATE, POTASSIUM CHLORIDE, CALCIUM CHLORIDE 600; 310; 30; 20 MG/100ML; MG/100ML; MG/100ML; MG/100ML
INJECTION, SOLUTION INTRAVENOUS CONTINUOUS PRN
Status: DISCONTINUED | OUTPATIENT
Start: 2024-12-26 | End: 2024-12-26 | Stop reason: SURG

## 2024-12-26 RX ORDER — ROCURONIUM BROMIDE 10 MG/ML
INJECTION, SOLUTION INTRAVENOUS AS NEEDED
Status: DISCONTINUED | OUTPATIENT
Start: 2024-12-26 | End: 2024-12-26 | Stop reason: SURG

## 2024-12-26 RX ORDER — MONTELUKAST SODIUM 10 MG/1
10 TABLET ORAL NIGHTLY
Status: DISCONTINUED | OUTPATIENT
Start: 2024-12-27 | End: 2024-12-27 | Stop reason: HOSPADM

## 2024-12-26 RX ORDER — HYDROCODONE BITARTRATE AND ACETAMINOPHEN 5; 325 MG/1; MG/1
1 TABLET ORAL ONCE AS NEEDED
Status: DISCONTINUED | OUTPATIENT
Start: 2024-12-26 | End: 2024-12-26 | Stop reason: HOSPADM

## 2024-12-26 RX ORDER — BUPIVACAINE HYDROCHLORIDE 2.5 MG/ML
INJECTION, SOLUTION EPIDURAL; INFILTRATION; INTRACAUDAL
Status: COMPLETED | OUTPATIENT
Start: 2024-12-26 | End: 2024-12-26

## 2024-12-26 RX ORDER — MEPERIDINE HYDROCHLORIDE 25 MG/ML
12.5 INJECTION INTRAMUSCULAR; INTRAVENOUS; SUBCUTANEOUS
Status: DISCONTINUED | OUTPATIENT
Start: 2024-12-26 | End: 2024-12-26 | Stop reason: HOSPADM

## 2024-12-26 RX ORDER — ACETAMINOPHEN 325 MG/1
650 TABLET ORAL EVERY 4 HOURS PRN
Status: DISCONTINUED | OUTPATIENT
Start: 2024-12-26 | End: 2024-12-27 | Stop reason: HOSPADM

## 2024-12-26 RX ORDER — FAMOTIDINE 20 MG/1
20 TABLET, FILM COATED ORAL
Status: COMPLETED | OUTPATIENT
Start: 2024-12-26 | End: 2024-12-26

## 2024-12-26 RX ORDER — SODIUM CHLORIDE 0.9 % (FLUSH) 0.9 %
3-10 SYRINGE (ML) INJECTION AS NEEDED
Status: DISCONTINUED | OUTPATIENT
Start: 2024-12-26 | End: 2024-12-26 | Stop reason: HOSPADM

## 2024-12-26 RX ORDER — PROMETHAZINE HYDROCHLORIDE 25 MG/1
25 SUPPOSITORY RECTAL ONCE AS NEEDED
Status: DISCONTINUED | OUTPATIENT
Start: 2024-12-26 | End: 2024-12-26 | Stop reason: HOSPADM

## 2024-12-26 RX ORDER — BUPIVACAINE HCL/0.9 % NACL/PF 0.125 %
PLASTIC BAG, INJECTION (ML) EPIDURAL AS NEEDED
Status: DISCONTINUED | OUTPATIENT
Start: 2024-12-26 | End: 2024-12-26 | Stop reason: SURG

## 2024-12-26 RX ORDER — PANTOPRAZOLE SODIUM 40 MG/1
40 TABLET, DELAYED RELEASE ORAL
Status: DISCONTINUED | OUTPATIENT
Start: 2024-12-27 | End: 2024-12-27 | Stop reason: HOSPADM

## 2024-12-26 RX ORDER — ALPRAZOLAM 0.5 MG
0.5 TABLET ORAL 2 TIMES DAILY PRN
Status: DISCONTINUED | OUTPATIENT
Start: 2024-12-26 | End: 2024-12-27 | Stop reason: HOSPADM

## 2024-12-26 RX ORDER — VANCOMYCIN HYDROCHLORIDE 1 G/20ML
INJECTION, POWDER, LYOPHILIZED, FOR SOLUTION INTRAVENOUS AS NEEDED
Status: DISCONTINUED | OUTPATIENT
Start: 2024-12-26 | End: 2024-12-26 | Stop reason: HOSPADM

## 2024-12-26 RX ORDER — ACETAMINOPHEN 650 MG/1
650 SUPPOSITORY RECTAL EVERY 4 HOURS PRN
Status: DISCONTINUED | OUTPATIENT
Start: 2024-12-26 | End: 2024-12-27 | Stop reason: HOSPADM

## 2024-12-26 RX ORDER — ACETAMINOPHEN 500 MG
1000 TABLET ORAL ONCE
Status: COMPLETED | OUTPATIENT
Start: 2024-12-26 | End: 2024-12-26

## 2024-12-26 RX ORDER — EPHEDRINE SULFATE 50 MG/ML
INJECTION, SOLUTION INTRAVENOUS AS NEEDED
Status: DISCONTINUED | OUTPATIENT
Start: 2024-12-26 | End: 2024-12-26 | Stop reason: SURG

## 2024-12-26 RX ORDER — TRANEXAMIC ACID 10 MG/ML
1000 INJECTION, SOLUTION INTRAVENOUS ONCE
Status: COMPLETED | OUTPATIENT
Start: 2024-12-26 | End: 2024-12-26

## 2024-12-26 RX ORDER — DILTIAZEM HYDROCHLORIDE 240 MG/1
240 CAPSULE, COATED, EXTENDED RELEASE ORAL DAILY
Status: DISCONTINUED | OUTPATIENT
Start: 2024-12-27 | End: 2024-12-27 | Stop reason: HOSPADM

## 2024-12-26 RX ORDER — SODIUM CHLORIDE 0.9 % (FLUSH) 0.9 %
10 SYRINGE (ML) INJECTION EVERY 12 HOURS SCHEDULED
Status: DISCONTINUED | OUTPATIENT
Start: 2024-12-26 | End: 2024-12-26 | Stop reason: HOSPADM

## 2024-12-26 RX ORDER — LIDOCAINE HYDROCHLORIDE 10 MG/ML
INJECTION, SOLUTION EPIDURAL; INFILTRATION; INTRACAUDAL; PERINEURAL AS NEEDED
Status: DISCONTINUED | OUTPATIENT
Start: 2024-12-26 | End: 2024-12-26 | Stop reason: SURG

## 2024-12-26 RX ORDER — PROCHLORPERAZINE EDISYLATE 5 MG/ML
10 INJECTION INTRAMUSCULAR; INTRAVENOUS ONCE AS NEEDED
Status: DISCONTINUED | OUTPATIENT
Start: 2024-12-26 | End: 2024-12-26 | Stop reason: HOSPADM

## 2024-12-26 RX ORDER — PREGABALIN 75 MG/1
75 CAPSULE ORAL ONCE
Status: COMPLETED | OUTPATIENT
Start: 2024-12-26 | End: 2024-12-26

## 2024-12-26 RX ORDER — DEXMEDETOMIDINE HYDROCHLORIDE 100 UG/ML
INJECTION, SOLUTION INTRAVENOUS AS NEEDED
Status: DISCONTINUED | OUTPATIENT
Start: 2024-12-26 | End: 2024-12-26 | Stop reason: SURG

## 2024-12-26 RX ORDER — SODIUM CHLORIDE, SODIUM LACTATE, POTASSIUM CHLORIDE, CALCIUM CHLORIDE 600; 310; 30; 20 MG/100ML; MG/100ML; MG/100ML; MG/100ML
9 INJECTION, SOLUTION INTRAVENOUS CONTINUOUS
Status: ACTIVE | OUTPATIENT
Start: 2024-12-26 | End: 2024-12-27

## 2024-12-26 RX ORDER — HYDROMORPHONE HYDROCHLORIDE 1 MG/ML
0.5 INJECTION, SOLUTION INTRAMUSCULAR; INTRAVENOUS; SUBCUTANEOUS
Status: DISCONTINUED | OUTPATIENT
Start: 2024-12-26 | End: 2024-12-26 | Stop reason: HOSPADM

## 2024-12-26 RX ORDER — HYDROMORPHONE HYDROCHLORIDE 1 MG/ML
0.5 INJECTION, SOLUTION INTRAMUSCULAR; INTRAVENOUS; SUBCUTANEOUS
Status: DISCONTINUED | OUTPATIENT
Start: 2024-12-26 | End: 2024-12-27 | Stop reason: HOSPADM

## 2024-12-26 RX ORDER — LIDOCAINE HYDROCHLORIDE 10 MG/ML
0.5 INJECTION, SOLUTION EPIDURAL; INFILTRATION; INTRACAUDAL; PERINEURAL ONCE AS NEEDED
Status: COMPLETED | OUTPATIENT
Start: 2024-12-26 | End: 2024-12-26

## 2024-12-26 RX ORDER — SODIUM CHLORIDE 450 MG/100ML
50 INJECTION, SOLUTION INTRAVENOUS CONTINUOUS
Status: DISCONTINUED | OUTPATIENT
Start: 2024-12-26 | End: 2024-12-27 | Stop reason: HOSPADM

## 2024-12-26 RX ORDER — HYDROCODONE BITARTRATE AND ACETAMINOPHEN 5; 325 MG/1; MG/1
1 TABLET ORAL EVERY 4 HOURS PRN
Status: DISCONTINUED | OUTPATIENT
Start: 2024-12-26 | End: 2024-12-27 | Stop reason: HOSPADM

## 2024-12-26 RX ORDER — HYDRALAZINE HYDROCHLORIDE 20 MG/ML
5 INJECTION INTRAMUSCULAR; INTRAVENOUS
Status: DISCONTINUED | OUTPATIENT
Start: 2024-12-26 | End: 2024-12-26 | Stop reason: HOSPADM

## 2024-12-26 RX ORDER — NALOXONE HCL 0.4 MG/ML
0.4 VIAL (ML) INJECTION AS NEEDED
Status: DISCONTINUED | OUTPATIENT
Start: 2024-12-26 | End: 2024-12-26 | Stop reason: HOSPADM

## 2024-12-26 RX ORDER — SODIUM CHLORIDE 0.9 % (FLUSH) 0.9 %
3 SYRINGE (ML) INJECTION EVERY 12 HOURS SCHEDULED
Status: DISCONTINUED | OUTPATIENT
Start: 2024-12-26 | End: 2024-12-26 | Stop reason: HOSPADM

## 2024-12-26 RX ORDER — SODIUM CHLORIDE 9 MG/ML
9 INJECTION, SOLUTION INTRAVENOUS AS NEEDED
Status: DISCONTINUED | OUTPATIENT
Start: 2024-12-26 | End: 2024-12-26 | Stop reason: HOSPADM

## 2024-12-26 RX ORDER — ONDANSETRON 2 MG/ML
INJECTION INTRAMUSCULAR; INTRAVENOUS AS NEEDED
Status: DISCONTINUED | OUTPATIENT
Start: 2024-12-26 | End: 2024-12-26 | Stop reason: SURG

## 2024-12-26 RX ORDER — DEXAMETHASONE SODIUM PHOSPHATE 4 MG/ML
INJECTION, SOLUTION INTRA-ARTICULAR; INTRALESIONAL; INTRAMUSCULAR; INTRAVENOUS; SOFT TISSUE AS NEEDED
Status: DISCONTINUED | OUTPATIENT
Start: 2024-12-26 | End: 2024-12-26 | Stop reason: SURG

## 2024-12-26 RX ORDER — PROMETHAZINE HYDROCHLORIDE 25 MG/1
25 TABLET ORAL ONCE AS NEEDED
Status: DISCONTINUED | OUTPATIENT
Start: 2024-12-26 | End: 2024-12-26 | Stop reason: HOSPADM

## 2024-12-26 RX ORDER — ONDANSETRON 2 MG/ML
4 INJECTION INTRAMUSCULAR; INTRAVENOUS EVERY 6 HOURS PRN
Status: DISCONTINUED | OUTPATIENT
Start: 2024-12-26 | End: 2024-12-27 | Stop reason: HOSPADM

## 2024-12-26 RX ORDER — NALOXONE HCL 0.4 MG/ML
0.1 VIAL (ML) INJECTION
Status: DISCONTINUED | OUTPATIENT
Start: 2024-12-26 | End: 2024-12-27 | Stop reason: HOSPADM

## 2024-12-26 RX ORDER — ROPIVACAINE HYDROCHLORIDE 2 MG/ML
INJECTION, SOLUTION EPIDURAL; INFILTRATION; PERINEURAL CONTINUOUS
Status: DISCONTINUED | OUTPATIENT
Start: 2024-12-26 | End: 2024-12-27 | Stop reason: HOSPADM

## 2024-12-26 RX ORDER — PROPOFOL 10 MG/ML
VIAL (ML) INTRAVENOUS AS NEEDED
Status: DISCONTINUED | OUTPATIENT
Start: 2024-12-26 | End: 2024-12-26 | Stop reason: SURG

## 2024-12-26 RX ORDER — ONDANSETRON 4 MG/1
4 TABLET, ORALLY DISINTEGRATING ORAL EVERY 6 HOURS PRN
Status: DISCONTINUED | OUTPATIENT
Start: 2024-12-26 | End: 2024-12-27 | Stop reason: HOSPADM

## 2024-12-26 RX ORDER — LABETALOL HYDROCHLORIDE 5 MG/ML
5 INJECTION, SOLUTION INTRAVENOUS
Status: DISCONTINUED | OUTPATIENT
Start: 2024-12-26 | End: 2024-12-26 | Stop reason: HOSPADM

## 2024-12-26 RX ORDER — FENTANYL CITRATE 50 UG/ML
50 INJECTION, SOLUTION INTRAMUSCULAR; INTRAVENOUS
Status: DISCONTINUED | OUTPATIENT
Start: 2024-12-26 | End: 2024-12-26 | Stop reason: HOSPADM

## 2024-12-26 RX ORDER — FENTANYL CITRATE 50 UG/ML
INJECTION, SOLUTION INTRAMUSCULAR; INTRAVENOUS
Status: COMPLETED | OUTPATIENT
Start: 2024-12-26 | End: 2024-12-26

## 2024-12-26 RX ORDER — IPRATROPIUM BROMIDE AND ALBUTEROL SULFATE 2.5; .5 MG/3ML; MG/3ML
3 SOLUTION RESPIRATORY (INHALATION) ONCE AS NEEDED
Status: DISCONTINUED | OUTPATIENT
Start: 2024-12-26 | End: 2024-12-26 | Stop reason: HOSPADM

## 2024-12-26 RX ORDER — DEXAMETHASONE SODIUM PHOSPHATE 10 MG/ML
INJECTION, SOLUTION INTRAMUSCULAR; INTRAVENOUS
Status: COMPLETED | OUTPATIENT
Start: 2024-12-26 | End: 2024-12-26

## 2024-12-26 RX ADMIN — TRANEXAMIC ACID 1000 MG: 10 INJECTION, SOLUTION INTRAVENOUS at 10:28

## 2024-12-26 RX ADMIN — FENTANYL CITRATE 100 MCG: 50 INJECTION, SOLUTION INTRAMUSCULAR; INTRAVENOUS at 08:41

## 2024-12-26 RX ADMIN — DEXMEDETOMIDINE HYDROCHLORIDE 10 MCG: 100 INJECTION, SOLUTION INTRAVENOUS at 11:01

## 2024-12-26 RX ADMIN — DEXAMETHASONE SODIUM PHOSPHATE 8 MG: 4 INJECTION INTRA-ARTICULAR; INTRALESIONAL; INTRAMUSCULAR; INTRAVENOUS; SOFT TISSUE at 10:20

## 2024-12-26 RX ADMIN — SODIUM CHLORIDE 2000 MG: 900 INJECTION INTRAVENOUS at 10:15

## 2024-12-26 RX ADMIN — Medication 100 MCG: at 10:20

## 2024-12-26 RX ADMIN — EPHEDRINE SULFATE 10 MG: 50 INJECTION INTRAVENOUS at 11:38

## 2024-12-26 RX ADMIN — TRANEXAMIC ACID 1000 MG: 10 INJECTION, SOLUTION INTRAVENOUS at 11:17

## 2024-12-26 RX ADMIN — SUGAMMADEX 350 MG: 100 INJECTION, SOLUTION INTRAVENOUS at 11:33

## 2024-12-26 RX ADMIN — Medication 1000 MG: at 12:04

## 2024-12-26 RX ADMIN — SODIUM CHLORIDE 2000 MG: 900 INJECTION INTRAVENOUS at 19:14

## 2024-12-26 RX ADMIN — ONDANSETRON 4 MG: 2 INJECTION INTRAMUSCULAR; INTRAVENOUS at 11:33

## 2024-12-26 RX ADMIN — EPHEDRINE SULFATE 10 MG: 50 INJECTION INTRAVENOUS at 11:44

## 2024-12-26 RX ADMIN — ROCURONIUM BROMIDE 20 MG: 10 INJECTION INTRAVENOUS at 11:08

## 2024-12-26 RX ADMIN — FAMOTIDINE 20 MG: 20 TABLET, FILM COATED ORAL at 08:39

## 2024-12-26 RX ADMIN — ROCURONIUM BROMIDE 100 MG: 10 INJECTION INTRAVENOUS at 10:08

## 2024-12-26 RX ADMIN — PHENYLEPHRINE HYDROCHLORIDE 0.5 MCG/KG/MIN: 10 INJECTION INTRAVENOUS at 10:20

## 2024-12-26 RX ADMIN — SODIUM CHLORIDE, POTASSIUM CHLORIDE, SODIUM LACTATE AND CALCIUM CHLORIDE 9 ML/HR: 600; 310; 30; 20 INJECTION, SOLUTION INTRAVENOUS at 08:39

## 2024-12-26 RX ADMIN — SODIUM CHLORIDE, POTASSIUM CHLORIDE, SODIUM LACTATE AND CALCIUM CHLORIDE: 600; 310; 30; 20 INJECTION, SOLUTION INTRAVENOUS at 10:38

## 2024-12-26 RX ADMIN — Medication 100 MCG: at 10:18

## 2024-12-26 RX ADMIN — BUPIVACAINE HYDROCHLORIDE 8 ML: 2.5 INJECTION, SOLUTION EPIDURAL; INFILTRATION; INTRACAUDAL; PERINEURAL at 08:41

## 2024-12-26 RX ADMIN — ACETAMINOPHEN 1000 MG: 500 TABLET, FILM COATED ORAL at 08:39

## 2024-12-26 RX ADMIN — LIDOCAINE HYDROCHLORIDE 0.5 ML: 10 INJECTION, SOLUTION EPIDURAL; INFILTRATION; INTRACAUDAL; PERINEURAL at 08:39

## 2024-12-26 RX ADMIN — DEXAMETHASONE SODIUM PHOSPHATE 2 MG: 10 INJECTION INTRAMUSCULAR; INTRAVENOUS at 08:55

## 2024-12-26 RX ADMIN — PREGABALIN 75 MG: 75 CAPSULE ORAL at 08:39

## 2024-12-26 RX ADMIN — SODIUM CHLORIDE 1000 MG: 9 INJECTION, SOLUTION INTRAVENOUS at 08:30

## 2024-12-26 RX ADMIN — PROPOFOL 200 MG: 10 INJECTION, EMULSION INTRAVENOUS at 10:08

## 2024-12-26 RX ADMIN — BUPIVACAINE HYDROCHLORIDE 30 ML: 2.5 INJECTION, SOLUTION EPIDURAL; INFILTRATION; INTRACAUDAL at 08:55

## 2024-12-26 RX ADMIN — LIDOCAINE HYDROCHLORIDE 100 MG: 10 INJECTION, SOLUTION EPIDURAL; INFILTRATION; INTRACAUDAL; PERINEURAL at 10:08

## 2024-12-26 NOTE — ANESTHESIA PROCEDURE NOTES
Peripheral Block      Patient reassessed immediately prior to procedure    Patient location during procedure: pre-op  Start time: 12/26/2024 8:41 AM  Reason for block: at surgeon's request and post-op pain management  Performed by  Anesthesiologist: Bro Paula MD  Assisted by: Brandy Pino RN  Preanesthetic Checklist  Completed: patient identified, IV checked, site marked, risks and benefits discussed, surgical consent, monitors and equipment checked, pre-op evaluation and timeout performed  Prep:  Sterile barriers:cap, gloves, mask and washed/disinfected hands  Prep: ChloraPrep  Patient monitoring: blood pressure monitoring, continuous pulse oximetry and EKG  Procedure    Sedation: yes  Performed under: local infiltration  Guidance:ultrasound guided    ULTRASOUND INTERPRETATION.  Using ultrasound guidance a 20 G gauge needle was placed in close proximity to the nerve, at which point, under ultrasound guidance anesthetic was injected in the area of the nerve and spread of the anesthesia was seen on ultrasound in close proximity thereto.  There were no abnormalities seen on ultrasound; a digital image was taken; and the patient tolerated the procedure with no complications. Images:still images obtained, printed/placed on chart    Laterality:right  Block Type:interscalene  Injection Technique:catheter  Needle Type:Tuohy and echogenic  Needle Gauge:18 G  Resistance on Injection: none  Catheter Size:20 G (20g)  Cath Depth at skin: 9 cm    Medications Used: fentaNYL citrate (PF) (SUBLIMAZE) injection - Intravenous   100 mcg - 12/26/2024 8:41:00 AM  bupivacaine PF (MARCAINE) 0.25 % injection - Injection   8 mL - 12/26/2024 8:41:00 AM      Post Assessment  Injection Assessment: negative aspiration for heme, no paresthesia on injection and incremental injection  Patient Tolerance:comfortable throughout block  Complications:no  Additional Notes  CATHETER  A high-frequency linear transducer, with sterile cover, was  "placed in the supraclavicular fossa to identify the subclavian artery and trunks and divisions of the brachial plexus. The transducer was then moved in a cephalad orientation with a slight rotation to continue visualization of the brachial plexus from the trunks and divisions, on to the C5-C7 roots. The insertion site was prepped and draped in sterile fashion. Skin and cutaneous tissue was infiltrated with 2-5 ml of 1% Lidocaine. Using ultrasound-guidance, an 18-gauge Contiplex Ultra 360 Touhy needle was advanced in plane from lateral to medial. Preservative-free normal saline was utilized for hydro-dissection of tissue, advancement of Touhy, and to confirm final needle placement at the fascial plane between the middle scalene muscle and sheath of the brachial plexus (C5-C7). A 20-gauge Contiplex Echo catheter was placed through the needle and advance out the tip of the Touhy 3-5 cm with the \"Gross Flip\". The Touhy needle was then removed, and final catheter position verified lateral to the brachial plexus with local anesthetic (LA) and ultrasound visualization. The catheter was secured in the usual fashion with skin glue, benzoin, steri-strips, CHG tegaderm and label noting \"Nerve Block Catheter\". Jerk tape applied at yellow connector and catheter connection. All LA was injected in increments of 3-5 ml after catheter secured. Aspiration every 5 ml to prevent intravascular injection. Injection was completed with negative aspiration of blood and negative intravascular injection. Injection pressures were normal with minimal resistance.   Performed by: Sharan Pearson, CRNA            "

## 2024-12-26 NOTE — ANESTHESIA PROCEDURE NOTES
Peripheral Block      Patient reassessed immediately prior to procedure    Patient location during procedure: OR  Start time: 12/26/2024 8:55 AM  Reason for block: at surgeon's request and post-op pain management  Performed by  Anesthesiologist: Bro Paula MD  Assisted by: Brandy Pino RN  Preanesthetic Checklist  Completed: patient identified, IV checked, site marked, risks and benefits discussed, surgical consent, monitors and equipment checked, pre-op evaluation and timeout performed  Prep:  Pt Position: supine  Sterile barriers:cap, gloves, mask and washed/disinfected hands  Prep: ChloraPrep  Patient monitoring: blood pressure monitoring, continuous pulse oximetry and EKG  Procedure  Performed under: general  Guidance:ultrasound guided and landmark technique  Images:still images obtained, printed/placed on chart    Laterality:right  Block Type:PECS I and PECS II  Injection Technique:single-shot  Needle Type:short-bevel  Needle Gauge:20 G  Resistance on Injection: none    Medications Used: dexamethasone sodium phosphate injection - Injection   2 mg - 12/26/2024 8:55:00 AM  bupivacaine PF (MARCAINE) 0.25 % injection - Injection   30 mL - 12/26/2024 8:55:00 AM      Medications  Preservative Free Saline:10ml  Comment:Block Injection:  Total volume of LA divided between Right and Left sided blocks         Post Assessment  Injection Assessment: negative aspiration for heme, incremental injection and no paresthesia on injection  Patient Tolerance:comfortable throughout block  Complications:no  Additional Notes  Interpectoral-Pectoserratus Plane   A high-frequency linear transducer, with sterile cover, was placed medial to the coracoid process in the paramedian sagittal plane. The transducer was moved caudally to the 4th rib and rotated slightly to allow an in-plane needle trajectory from medial to lateral. Pectoralis Major Muscle (PMM), Pectoralis Minor Muscle (PmM), Thoracoacromial Artery, Ribs, and Pleura  "were identified under ultrasound. The insertion site was prepped in sterile fashion and then localized with 2-5 ml of 1% Lidocaine. Using ultrasound-guidance, a 20-gauge B-Crowley 4\" Ultraplex 360 non-stimulating echogenic needle was advanced in plane until the tip of the needle was in the fascial plane between the PMM and PmM, lateral to the Thoracoacromial Artery. 1-3ml of preservative free normal saline was used to hydro-dissect the fascial planes. After the fascial plane was verified, 10ml local anesthetic (LA) was injected for Interpectoral fascial plane block. The needle was continued along the same path to the level of the 4th rib below PmM.  Initially preservative free normal saline was used to confirm needle position and then 20 ml of LA was injected for Pectoserratus fascial plane block. Aspiration every 5 ml to prevent intravascular injection. Injection was completed with negative aspiration of blood and negative intravascular injection. Injection pressures were normal with minimal resistance.     Performed by: Sharan Pearson, JUDITH          "

## 2024-12-26 NOTE — ANESTHESIA POSTPROCEDURE EVALUATION
Patient: Esther Cooper    Procedure Summary       Date: 12/26/24 [facility] / [facility]    Anesthesia Start: 1003 Anesthesia Stop: 1204    Procedure: RIGHT TOTAL SHOULDER REVERSE ARTHROPLASTY (Right: Shoulder) Diagnosis:       Rotator cuff arthropathy of right shoulder      (rotator cuff arthropathy)    Surgeons: Reji Maki MD Provider: Reuben Ochoa MD    Anesthesia Type: general with block ASA Status: 3            Anesthesia Type: general with block    Vitals  No vitals data found for the desired time range.          Post Anesthesia Care and Evaluation    Patient location during evaluation: PACU  Patient participation: waiting for patient participation  Level of consciousness: sleepy but conscious  Pain management: adequate    Airway patency: patent  Anesthetic complications: No anesthetic complications  PONV Status: none  Cardiovascular status: hemodynamically stable and acceptable  Respiratory status: nonlabored ventilation, acceptable, nasal cannula and oral airway  Hydration status: acceptable

## 2024-12-26 NOTE — ANESTHESIA PROCEDURE NOTES
Airway  Urgency: elective    Date/Time: 12/26/2024 10:10 AM  Airway not difficult    General Information and Staff    Patient location during procedure: OR  CRNA/CAA: Nyla Huitron CRNA    Indications and Patient Condition  Indications for airway management: airway protection    Preoxygenated: yes  Mask difficulty assessment: 1 - vent by mask    Final Airway Details  Final airway type: endotracheal airway      Successful airway: ETT  Cuffed: yes   Successful intubation technique: video laryngoscopy  Facilitating devices/methods: intubating stylet  Endotracheal tube insertion site: oral  Blade: Browning  Blade size: 3  ETT size (mm): 7.0  Cormack-Lehane Classification: grade I - full view of glottis  Placement verified by: chest auscultation and capnometry   Measured from: teeth  ETT/EBT  to teeth (cm): 21  Number of attempts at approach: 1  Assessment: lips, teeth, and gum same as pre-op and atraumatic intubation

## 2024-12-26 NOTE — CASE MANAGEMENT/SOCIAL WORK
Continued Stay Note   [place]     Patient Name: Esther Cooper  MRN: [id number]  Today's Date: 12/26/2024    Admit Date: 12/26/2024    Plan: home   Discharge Plan       Row Name 12/26/24 1432       Plan    Plan home    Patient/Family in Agreement with Plan yes    Plan Comments Pt lives in [facility] with her significant other. She reports she is independent with ADLs and mobility. Denies use of any DME. Her plan for discharge is to return home. No dc needs at this tiime.    Final Discharge Disposition Code 01 - home or self-care                   Discharge Codes    No documentation.                       Gloria Chapa RN

## 2024-12-26 NOTE — H&P
Pre-Op H&P  Esther Cooper  [id number]  1965      Chief complaint: Right shoulder pain      Subjective:  Patient is a 59 y.o.female presents for scheduled surgery by Dr. Maki. She anticipates a RIGHT TOTAL SHOULDER REVERSE ARTHROPLASTY  today. Her shoulder has been painful with limited ROM for several years. She had previous right shoulder surgery. She denies numbness, tingling or difficulty with  right hand.      Review of Systems:  Constitutional-- No fever, chills or sweats. No fatigue.  CV-- No chest pain, palpitation or syncope. +HTN, HLD  +cardiac clearance   Resp-- No cough, hemoptysis. +SOB, COPD, home O2  Skin--No rashes or lesions      Allergies:   Allergies   Allergen Reactions    Codeine Nausea Only    Flaxseed (Linseed) Other (See Comments)     Allergy test         Home Meds:  Medications Prior to Admission   Medication Sig Dispense Refill Last Dose/Taking    acetaminophen (TYLENOL) 650 MG 8 hr tablet Take 1 tablet by mouth Every 8 (Eight) Hours As Needed for Mild Pain.       albuterol (PROVENTIL HFA;VENTOLIN HFA) 108 (90 BASE) MCG/ACT inhaler Inhale 2 puffs Every 4 (Four) Hours As Needed for Wheezing.       albuterol (PROVENTIL) (2.5 MG/3ML) 0.083% nebulizer solution Take 2.5 mg by nebulization Every 4 (Four) Hours As Needed for Wheezing. Pt uses every morning.       ALPRAZolam (XANAX) 1 MG tablet Take 1 tablet by mouth 2 (Two) Times a Day As Needed.       benzoyl peroxide 5 % external wash Use as directed by Dr. Maki 148 g 0     Budeson-Glycopyrrol-Formoterol (Breztri Aerosphere) 160-9-4.8 MCG/ACT aerosol inhaler Inhale 2 puffs 2 (Two) Times a Day.       Cholecalciferol (Vitamin D3) 1.25 MG (92239 UT) capsule Take 1 capsule by mouth Daily. SATURDAYS       dilTIAZem CD (CARDIZEM CD) 240 MG 24 hr capsule Take 1 capsule by mouth Daily. 30 capsule 0     fenofibrate (TRICOR) 145 MG tablet Take 1 tablet by mouth Daily. 90 tablet 3     fexofenadine (ALLEGRA) 180 MG tablet Take 1 tablet by  mouth Daily.       fluticasone (FLONASE) 50 MCG/ACT nasal spray 2 sprays into the nostril(s) as directed by provider As Needed for Allergies.       furosemide (LASIX) 20 MG tablet Take 1 tablet by mouth Daily. As needed 90 tablet 3     HYDROcodone-acetaminophen (NORCO) 5-325 MG per tablet Take 1 tablet by mouth Every 8 (Eight) Hours As Needed.       indomethacin SR (INDOCIN SR) 75 MG CR capsule Take 1 capsule by mouth Daily. 90 capsule 3     lansoprazole (PREVACID) 30 MG capsule Take 1 capsule by mouth Daily As Needed.       montelukast (SINGULAIR) 10 MG tablet Take 1 tablet by mouth Every Night.       mupirocin (BACTROBAN) 2 % ointment Administer 1 application into each nostril as directed by provider 2 (Two) Times a Day beginning 5 days before surgery 22 g 0     O2 (OXYGEN) Inhale 2 L/min Every Night.       ondansetron (ZOFRAN) 4 MG tablet Take 1 tablet by mouth Every 8 (Eight) Hours As Needed for pain/ post operative nausea 30 tablet 0     ondansetron (ZOFRAN) 4 MG tablet Take 1 tablet by mouth Every 8 (Eight) Hours As Needed for post-op nausea 30 tablet 0     PARoxetine (PAXIL) 30 MG tablet Take 1 tablet by mouth Daily.       Tirzepatide (MOUNJARO) 10 MG/0.5ML solution pen-injector pen Inject 10 mg under the skin into the appropriate area as directed 1 (One) Time Per Week. LAST INJECTION 12/17/2024       valACYclovir (VALTREX) 1000 MG tablet Take 1 tablet by mouth As Needed.            PMH:   Past Medical History:   Diagnosis Date    Anxiety     Asthma     COPD (chronic obstructive pulmonary disease)     COVID 08/2021    Depression     GERD (gastroesophageal reflux disease)     History of transfusion 2024    AT , PATIENT DENIES REACTION    Hypertension     Hypoglycemia     Insomnia     Mixed hyperlipidemia 05/22/2017    MRSA (methicillin resistant Staphylococcus aureus) 2024    RIGHT SURGICAL SITE AREA, CHEST TUBE LOCATION    Non-small cell cancer of upper lobe of lung 2024    Osteopenia of left hip 2020     Status post right rotator cuff repair 10/2023     PSH:    Past Surgical History:   Procedure Laterality Date    CARDIAC CATHETERIZATION      Normal as per pt    CARDIOVASCULAR STRESS TEST  2018    Lexiscan- no infarct or ischemia.     COLONOSCOPY      ECHO - CONVERTED  2018    EF 65%. Mod Pericardial Effusion    ECHO - CONVERTED  11/10/2021    EF 60%. LA- 3.7 Cm. Mild MR. RVSP- 15 mmHg. Mod Pericardial Effusion    ECHO - CONVERTED  2023    EF 60%. LA- 3.9. Trace-Mild MR. RVSP- 17 mmHg. Mod PE    EYE SURGERY Bilateral     CATARACTS    LUNG LOBECTOMY Right     right upper lobe    OTHER SURGICAL HISTORY  2019    Biotel cardiac monitor -19- baseline sinus, no arrhythmia    SHOULDER ARTHROSCOPY W/ ROTATOR CUFF REPAIR Left 2021    Procedure: SHOULDER SCOPE, MAJOR DEBRIDMENT WITH BICEPS TENOTOMY LEFT;  Surgeon: Reji Maki MD;  [facility];  Service: Orthopedics;  Laterality: Left;    TONSILLECTOMY         Immunization History:  Influenza: UTD  Pneumococcal: No  Tetanus: UTD    Social History:   Tobacco:   Social History     Tobacco Use   Smoking Status Former    Current packs/day: 0.00    Average packs/day: 1.5 packs/day for 24.0 years (36.0 ttl pk-yrs)    Types: Cigarettes    Start date: 1982    Quit date: 2006    Years since quittin.9   Smokeless Tobacco Never      Alcohol:     Social History     Substance and Sexual Activity   Alcohol Use Not Currently    Comment: occasional         Physical Exam: VS: /83  HR 80  RR 16  T 98.1  Sat 96%RA      General Appearance:    Alert, cooperative, no distress, appears stated age   Head:    Normocephalic, without obvious abnormality, atraumatic   Lungs:     Clear to auscultation bilaterally, respirations unlabored    Heart:   Regular rate and rhythm, S1 and S2 normal    Abdomen:    Soft without tenderness   Extremities:   Extremities normal, atraumatic, no cyanosis or edema   Skin:   Skin color,  texture, turgor normal, no rashes or lesions   Neurologic:   Grossly intact     Results Review:     LABS:  Lab Results   Component Value Date    WBC 9.48 12/18/2024    HGB 12.2 12/18/2024    HCT 37.0 12/18/2024    MCV 89.6 12/18/2024     12/18/2024    NEUTROABS 7.28 (H) 12/18/2024    GLUCOSE 99 12/18/2024    BUN 14 12/18/2024    CREATININE 0.72 12/18/2024    EGFRIFNONA 83 05/10/2021     12/18/2024    K 4.5 12/18/2024     12/18/2024    CO2 26.0 12/18/2024    MG 2.0 08/19/2024    CALCIUM 9.3 12/18/2024    ALBUMIN 3.8 12/18/2024    AST 26 12/18/2024    ALT 20 12/18/2024    BILITOT 0.3 12/18/2024       RADIOLOGY:  Imaging Results (Last 72 Hours)       ** No results found for the last 72 hours. **            I reviewed the patient's new clinical results.    Cancer Staging (if applicable)  Cancer Patient: __ yes __no __unknown; If yes, clinical stage T:__ N:__M:__, stage group or __N/A      Impression: Right shoulder pain      Plan: RIGHT TOTAL SHOULDER REVERSE ARTHROPLASTY       Ade Brewer, MIMI   12/26/2024   08:20 EST

## 2024-12-26 NOTE — CONSULTS
[facility]  CONSULT NOTE      Patient Name: Esther Cooper  : 1965  MRN: [id number]    Primary Care Physician: Taylor Moeller APRN  Provider requesting consultation: Reji Maki MD    Subjective   Subjective     Reason for Consultation: post-op medical eval      HPI:  Esther Cooper is a 59 y.o. female with PMH of COPD, NSCLC s/p recent lobectomy, and HTN who presented for scheduled shoulder surgery. Hospital medicine consulted post-operatively for medical management.  Pt denies pain       Review of Systems   Constitutional:  Negative for fever.   HENT:  Negative for congestion.    Eyes:  Negative for visual disturbance.   Respiratory:  Negative for cough and shortness of breath.    Gastrointestinal:  Negative for abdominal pain, diarrhea and nausea.   Genitourinary:  Negative for dysuria and hematuria.   Musculoskeletal:  Positive for arthralgias.   Neurological:  Negative for dizziness and weakness.   Psychiatric/Behavioral:  Negative for behavioral problems and confusion.        All other systems reviewed and are negative.     Personal History     Past Medical History:   Diagnosis Date    Anxiety     Asthma     COPD (chronic obstructive pulmonary disease)     COVID 2021    Depression     GERD (gastroesophageal reflux disease)     History of transfusion     AT , PATIENT DENIES REACTION    Hypertension     Hypoglycemia     Insomnia     Mixed hyperlipidemia 2017    MRSA (methicillin resistant Staphylococcus aureus)     RIGHT SURGICAL SITE AREA, CHEST TUBE LOCATION    Non-small cell cancer of upper lobe of lung     Osteopenia of left hip 2020    Status post right rotator cuff repair 10/2023       Past Surgical History:   Procedure Laterality Date    CARDIAC CATHETERIZATION      Normal as per pt    CARDIOVASCULAR STRESS TEST  2018    Lexiscan- no infarct or ischemia.     COLONOSCOPY      ECHO - CONVERTED  2018     EF 65%. Mod Pericardial Effusion    ECHO - CONVERTED  11/10/2021    EF 60%. LA- 3.7 Cm. Mild MR. RVSP- 15 mmHg. Mod Pericardial Effusion    ECHO - CONVERTED  01/27/2023    EF 60%. LA- 3.9. Trace-Mild MR. RVSP- 17 mmHg. Mod PE    EYE SURGERY Bilateral     CATARACTS    LUNG LOBECTOMY Right     right upper lobe    OTHER SURGICAL HISTORY  05/29/2019    Biotel cardiac monitor 5/16-5/29/19- baseline sinus, no arrhythmia    SHOULDER ARTHROSCOPY W/ ROTATOR CUFF REPAIR Left 05/13/2021    Procedure: SHOULDER SCOPE, MAJOR DEBRIDMENT WITH BICEPS TENOTOMY LEFT;  Surgeon: Reji Maki MD;  Location: [facility];  Service: Orthopedics;  Laterality: Left;    TONSILLECTOMY         Family History:  family history includes COPD in her mother; Heart attack in her mother; Heart disease in her mother; Heart failure in her mother; Hypertension in her mother. Otherwise pertinent FHx was reviewed and unremarkable.     Social History:  reports that she quit smoking about 18 years ago. Her smoking use included cigarettes. She started smoking about 43 years ago. She has a 36 pack-year smoking history. She has never used smokeless tobacco. She reports that she does not currently use alcohol. She reports that she does not use drugs.    Medications:  ALPRAZolam, Budeson-Glycopyrrol-Formoterol, HYDROcodone-acetaminophen, O2, PARoxetine, Tirzepatide, Vitamin D3, acetaminophen, albuterol, albuterol sulfate HFA, benzoyl peroxide, dilTIAZem CD, fenofibrate, fexofenadine, fluticasone, furosemide, indomethacin SR, lansoprazole, montelukast, mupirocin, ondansetron, and valACYclovir    Scheduled Meds:ceFAZolin, 2,000 mg, Intravenous, Q8H      Continuous Infusions:lactated ringers, 9 mL/hr, Last Rate: Stopped (12/26/24 1323)  ropivacaine,   sodium chloride, 50 mL/hr      PRN Meds:.  acetaminophen **OR** acetaminophen    HYDROcodone-acetaminophen    HYDROmorphone **AND** naloxone    ondansetron ODT **OR** ondansetron    Allergies   Allergen  Reactions    Codeine Nausea Only    Flaxseed (Linseed) Other (See Comments)     Allergy test       Objective   Objective     Vital Signs:   Temp:  [96.5 °F (35.8 °C)-98.8 °F (37.1 °C)] 96.8 °F (36 °C)  Heart Rate:  [75-85] 82  Resp:  [12-16] 16  BP: (116-143)/(56-83) 132/62  Flow (L/min) (Oxygen Therapy):  [2-4] 2    Physical Exam  Constitutional: Awake, alert, up in chair  Eyes: PERRLA, sclerae anicteric, no conjunctival injection  HENT: NCAT, mucous membranes moist  Neck: Supple, no thyromegaly, no lymphadenopathy, trachea midline  Respiratory: Clear to auscultation bilaterally, nonlabored respirations   Cardiovascular: RRR, no murmurs, rubs, or gallops, palpable pedal pulses bilaterally  Gastrointestinal: Positive bowel sounds, soft, nontender, nondistended  Musculoskeletal: No bilateral ankle edema, no clubbing or cyanosis to extremities  Psychiatric: Appropriate affect, cooperative  Neurologic: Oriented x 3, CARVALHO, speech clear  Skin: No rashes noted. RUE in sling      Result Review:  I have personally reviewed the results from the time of admission and agree with these findings:  [x]  Laboratory  [x]  Radiology  []  EKG/Telemetry   []  Pathology  []  Old records  []  Other:  Most notable findings include:pre-op labs stable    LAB RESULTS:    Brief Urine Lab Results       None          Microbiology Results (last 10 days)       ** No results found for the last 240 hours. **            XR Shoulder 1 View Right    Result Date: 12/26/2024  XR SHOULDER 1 VW RIGHT Date of Exam: 12/26/2024 12:02 PM EST Indication: R Reverse TSA Comparison: None available. Findings: There has been placement of a total shoulder arthroplasty in near anatomic alignment. No periprosthetic fracture is identified. Postoperative soft tissue gas is noted.     Impression: Impression: Status post total shoulder arthroplasty in near-anatomic alignment, with no evidence of immediate complication. Electronically Signed: Grady Foster MD   12/26/2024 12:44 PM EST  Workstation ID: [id number]    Peripheral Block    Result Date: 12/26/2024  Sharan Pearson CRNA     12/26/2024  8:57 AM Peripheral Block Patient reassessed immediately prior to procedure Patient location during procedure: OR Start time: 12/26/2024 8:55 AM Reason for block: at surgeon's request and post-op pain management Performed by Anesthesiologist: Bro Paula MD Assisted by: Brandy Pino RN Preanesthetic Checklist Completed: patient identified, IV checked, site marked, risks and benefits discussed, surgical consent, monitors and equipment checked, pre-op evaluation and timeout performed Prep: Pt Position: supine Sterile barriers:cap, gloves, mask and washed/disinfected hands Prep: ChloraPrep Patient monitoring: blood pressure monitoring, continuous pulse oximetry and EKG Procedure Performed under: general Guidance:ultrasound guided and landmark technique Images:still images obtained, printed/placed on chart Laterality:right Block Type:PECS I and PECS II Injection Technique:single-shot Needle Type:short-bevel Needle Gauge:20 G Resistance on Injection: none Medications Used: dexamethasone sodium phosphate injection - Injection  2 mg - 12/26/2024 8:55:00 AM bupivacaine PF (MARCAINE) 0.25 % injection - Injection  30 mL - 12/26/2024 8:55:00 AM Medications Preservative Free Saline:10ml Comment:Block Injection:  Total volume of LA divided between Right and Left sided blocks   Post Assessment Injection Assessment: negative aspiration for heme, incremental injection and no paresthesia on injection Patient Tolerance:comfortable throughout block Complications:no Additional Notes Interpectoral-Pectoserratus Plane A high-frequency linear transducer, with sterile cover, was placed medial to the coracoid process in the paramedian sagittal plane. The transducer was moved caudally to the 4th rib and rotated slightly to allow an in-plane needle trajectory from medial to lateral. Pectoralis Major Muscle  "(PMM), Pectoralis Minor Muscle (PmM), Thoracoacromial Artery, Ribs, and Pleura were identified under ultrasound. The insertion site was prepped in sterile fashion and then localized with 2-5 ml of 1% Lidocaine. Using ultrasound-guidance, a 20-gauge B-Crowley 4\" Ultraplex 360 non-stimulating echogenic needle was advanced in plane until the tip of the needle was in the fascial plane between the PMM and PmM, lateral to the Thoracoacromial Artery. 1-3ml of preservative free normal saline was used to hydro-dissect the fascial planes. After the fascial plane was verified, 10ml local anesthetic (LA) was injected for Interpectoral fascial plane block. The needle was continued along the same path to the level of the 4th rib below PmM.  Initially preservative free normal saline was used to confirm needle position and then 20 ml of LA was injected for Pectoserratus fascial plane block. Aspiration every 5 ml to prevent intravascular injection. Injection was completed with negative aspiration of blood and negative intravascular injection. Injection pressures were normal with minimal resistance. Performed by: Sharan Pearson CRNA    Peripheral Block    Result Date: 12/26/2024  Sharan Pearson CRNA     12/26/2024  8:56 AM Peripheral Block Patient reassessed immediately prior to procedure Patient location during procedure: pre-op Start time: 12/26/2024 8:41 AM Reason for block: at surgeon's request and post-op pain management Performed by Anesthesiologist: Bro Paula MD Assisted by: Brandy Pino RN Preanesthetic Checklist Completed: patient identified, IV checked, site marked, risks and benefits discussed, surgical consent, monitors and equipment checked, pre-op evaluation and timeout performed Prep: Sterile barriers:cap, gloves, mask and washed/disinfected hands Prep: ChloraPrep Patient monitoring: blood pressure monitoring, continuous pulse oximetry and EKG Procedure Sedation: yes Performed under: local infiltration " Guidance:ultrasound guided ULTRASOUND INTERPRETATION.  Using ultrasound guidance a 20 G gauge needle was placed in close proximity to the nerve, at which point, under ultrasound guidance anesthetic was injected in the area of the nerve and spread of the anesthesia was seen on ultrasound in close proximity thereto.  There were no abnormalities seen on ultrasound; a digital image was taken; and the patient tolerated the procedure with no complications. Images:still images obtained, printed/placed on chart Laterality:right Block Type:interscalene Injection Technique:catheter Needle Type:Tuohy and echogenic Needle Gauge:18 G Resistance on Injection: none Catheter Size:20 G (20g) Cath Depth at skin: 9 cm Medications Used: fentaNYL citrate (PF) (SUBLIMAZE) injection - Intravenous  100 mcg - 12/26/2024 8:41:00 AM bupivacaine PF (MARCAINE) 0.25 % injection - Injection  8 mL - 12/26/2024 8:41:00 AM Post Assessment Injection Assessment: negative aspiration for heme, no paresthesia on injection and incremental injection Patient Tolerance:comfortable throughout block Complications:no Additional Notes CATHETER A high-frequency linear transducer, with sterile cover, was placed in the supraclavicular fossa to identify the subclavian artery and trunks and divisions of the brachial plexus. The transducer was then moved in a cephalad orientation with a slight rotation to continue visualization of the brachial plexus from the trunks and divisions, on to the C5-C7 roots. The insertion site was prepped and draped in sterile fashion. Skin and cutaneous tissue was infiltrated with 2-5 ml of 1% Lidocaine. Using ultrasound-guidance, an 18-gauge Contiplex Ultra 360 Touhy needle was advanced in plane from lateral to medial. Preservative-free normal saline was utilized for hydro-dissection of tissue, advancement of Touhy, and to confirm final needle placement at the fascial plane between the middle scalene muscle and sheath of the brachial  "plexus (C5-C7). A 20-gauge Contiplex Echo catheter was placed through the needle and advance out the tip of the Touhy 3-5 cm with the \"Gross Flip\". The Touhy needle was then removed, and final catheter position verified lateral to the brachial plexus with local anesthetic (LA) and ultrasound visualization. The catheter was secured in the usual fashion with skin glue, benzoin, steri-strips, CHG tegaderm and label noting \"Nerve Block Catheter\". Jerk tape applied at yellow connector and catheter connection. All LA was injected in increments of 3-5 ml after catheter secured. Aspiration every 5 ml to prevent intravascular injection. Injection was completed with negative aspiration of blood and negative intravascular injection. Injection pressures were normal with minimal resistance. Performed by: Sharan Pearson CRNA      Results for orders placed during the hospital encounter of 01/27/23    Adult Transthoracic Echo Complete W/ Cont if Necessary Per Protocol    Interpretation Summary    Left ventricular wall thickness is consistent with borderline concentric hypertrophy.    Left ventricular ejection fraction appears to be 56 - 60%.    Left ventricular diastolic function is consistent with (grade Ia w/high LAP) impaired relaxation.    The left atrial cavity is borderline dilated. 3.9 Cm    The right atrial cavity is borderline dilated.    No aortic valve regurgitation or stenosis is present    Trace to mild mitral valve regurgitation is present.    Trace to mild tricuspid valve regurgitation is present. RVSP- 17 mmHg    There is a moderate (1-2cm) circumferential pericardial effusion.    There is no evidence of cardiac tamponade      Assessment & Plan   Assessment & Plan     Active Hospital Problems    Diagnosis  POA    **Rotator cuff arthropathy of right shoulder [M12.811]  Yes    Chronic obstructive pulmonary disease [J44.9]  Yes    Essential hypertension [I10]  Yes      Resolved Hospital Problems   No resolved problems " to display.       Right shoulder pain/arthralgias  -s/p right total shoulder reverse arthroplasty  -managed by ortho    HTN  -restart home diltiazem    History of NSCLC  History of COPD  -s/p recent RUL lobectomy  -O2 dependent @[facility]  -restart home jose f and singulair      Thank you for allowing [facility] to provide consultative care for your patient, we will continue to follow while clinically appropriate.    Latricia Saucedo, APRN  12/26/24

## 2024-12-26 NOTE — ANESTHESIA PREPROCEDURE EVALUATION
Anesthesia Evaluation     Patient summary reviewed and Nursing notes reviewed   NPO Solid Status: > 8 hours  NPO Liquid Status: > 8 hours           Airway   Mallampati: I  TM distance: >3 FB  Neck ROM: full  No difficulty expected  Dental - normal exam     Pulmonary - normal exam   Cardiovascular   Exercise tolerance: poor (<4 METS)    Rhythm: regular  Rate: normal        Neuro/Psych  GI/Hepatic/Renal/Endo      Musculoskeletal     Abdominal    Substance History      OB/GYN          Other                    Anesthesia Plan    ASA 3     general with block     (IS block for post op pain control)    Anesthetic plan, risks, benefits, and alternatives have been provided, discussed and informed consent has been obtained with: patient.    CODE STATUS:

## 2024-12-26 NOTE — THERAPY EVALUATION
Patient Name: Esther Cooper  : 1965    MRN: [id number]                              Today's Date: 2024       Admit Date: 2024    Visit Dx: No diagnosis found.  Patient Active Problem List   Diagnosis    Essential hypertension    Mixed hyperlipidemia    Chronic obstructive pulmonary disease    Shortness of breath    Obesity with body mass index of 30.0-39.9    Rotator cuff arthropathy of right shoulder     Past Medical History:   Diagnosis Date    Anxiety     Asthma     COPD (chronic obstructive pulmonary disease)     COVID 2021    Depression     GERD (gastroesophageal reflux disease)     History of transfusion     AT , PATIENT DENIES REACTION    Hypertension     Hypoglycemia     Insomnia     Mixed hyperlipidemia 2017    MRSA (methicillin resistant Staphylococcus aureus)     RIGHT SURGICAL SITE AREA, CHEST TUBE LOCATION    Non-small cell cancer of upper lobe of lung     Osteopenia of left hip     Status post right rotator cuff repair 10/2023     Past Surgical History:   Procedure Laterality Date    CARDIAC CATHETERIZATION      Normal as per pt    CARDIOVASCULAR STRESS TEST  2018    Lexiscan- no infarct or ischemia.     COLONOSCOPY      ECHO - CONVERTED  2018    EF 65%. Mod Pericardial Effusion    ECHO - CONVERTED  11/10/2021    EF 60%. LA- 3.7 Cm. Mild MR. RVSP- 15 mmHg. Mod Pericardial Effusion    ECHO - CONVERTED  2023    EF 60%. LA- 3.9. Trace-Mild MR. RVSP- 17 mmHg. Mod PE    EYE SURGERY Bilateral     CATARACTS    LUNG LOBECTOMY Right     right upper lobe    OTHER SURGICAL HISTORY  2019    Biotel cardiac monitor -19- baseline sinus, no arrhythmia    SHOULDER ARTHROSCOPY W/ ROTATOR CUFF REPAIR Left 2021    Procedure: SHOULDER SCOPE, MAJOR DEBRIDMENT WITH BICEPS TENOTOMY LEFT;  Surgeon: Reji Maki MD;  Location: [facility];  Service: Orthopedics;  Laterality: Left;    TONSILLECTOMY        General Information        Row Name 12/26/24 1523          OT Time and Intention    Document Type evaluation  -CS     Mode of Treatment occupational therapy  -CS       Row Name 12/26/24 1523          General Information    Patient Profile Reviewed yes  -CS     Prior Level of Function independent:;all household mobility;ADL's  -CS     Existing Precautions/Restrictions fall;oxygen therapy device and L/min;other (see comments);right;shoulder  s/p R RTSA, nerve cath, elbow/wrist/hand ROM only, sling on AAT, RUE NWB  -CS     Barriers to Rehab medically complex  -CS       Row Name 12/26/24 1523          Living Environment    People in Home significant other  -CS       Row Name 12/26/24 1523          Home Main Entrance    Number of Stairs, Main Entrance one  -CS       Row Name 12/26/24 1523          Cognition    Orientation Status (Cognition) oriented x 4  -CS       Row Name 12/26/24 1523          Safety Issues/Impairments Affecting Functional Mobility    Impairments Affecting Function (Mobility) balance;endurance/activity tolerance;strength  -CS               User Key  (r) = Recorded By, (t) = Taken By, (c) = Cosigned By      Initials Name Provider Type    CS Gina Pineda, OT Occupational Therapist                     Mobility/ADL's       Row Name 12/26/24 1526          Transfers    Transfers sit-stand transfer;stand-sit transfer  -       Row Name 12/26/24 1526          Sit-Stand Transfer    Sit-Stand [place] (Transfers) contact guard;verbal cues  -       Row Name 12/26/24 1526          Stand-Sit Transfer    Stand-Sit [place] (Transfers) contact guard;verbal cues  -       Row Name 12/26/24 1526          Functional Mobility    Functional Mobility- Ind. Level minimum assist (75% patient effort);verbal cues required  -CS     Functional Mobility-Distance (Feet) --  120  -CS     Functional Mobility- Safety Issues balance decreased during turns;supplemental O2  -CS     Functional Mobility- Comment Pt required 2 standing rest  breaks, noted desat to 83% on RA and required 3L w/ 2 mins to recover to WNL  -CS     Patient was able to Ambulate yes  -CS       Row Name 12/26/24 1526          Activities of Daily Living    BADL Assessment/Intervention upper body dressing;bathing;lower body dressing  -CS       Row Name 12/26/24 1526          Mobility    Extremity Weight-bearing Status right upper extremity  -CS     Right Upper Extremity (Weight-bearing Status) non weight-bearing (NWB)  -CS       Row Name 12/26/24 1526          Upper Body Dressing Assessment/Training    [place]aines Level (Upper Body Dressing) don;doff;maximum assist (25% patient effort)  -CS     Position (Upper Body Dressing) supported sitting  -CS     Comment, (Upper Body Dressing) sling  -CS       Row Name 12/26/24 1526          Bathing Assessment/Intervention    Haines Level (Bathing) upper body;minimum assist (75% patient effort)  -CS     Position (Bathing) supported sitting  -CS     Comment, (Bathing) axilla care, educated on AE use, compensatory strategies, and nerve cath care  -CS       Row Name 12/26/24 1526          Lower Body Dressing Assessment/Training    [place]aines Level (Lower Body Dressing) don;minimum assist (75% patient effort)  -CS     Position (Lower Body Dressing) supported sitting;supported standing  -CS     Comment, (Lower Body Dressing) undergarment, educated on AE use, reports having at home  -CS               User Key  (r) = Recorded By, (t) = Taken By, (c) = Cosigned By      Initials Name Provider Type    Gina Sullivan OT Occupational Therapist                   Obj/Interventions       Row Name 12/26/24 1534          Sensory Assessment (Somatosensory)    Sensory Assessment (Somatosensory) right UE  -CS     Right UE Sensory Assessment light touch awareness  -CS     Sensory Subjective Reports numbness  -CS       Row Name 12/26/24 1536          Vision Assessment/Intervention    Visual Impairment/Limitations WFL  -CS       Row Name 12/26/24 1531           Range of Motion Comprehensive    Comment, General Range of Motion RUE elbow/wrist/hand WFL, HAMMAD grossly WFL  -       Row Name 12/26/24 1534          Strength Comprehensive (MMT)    Comment, General Manual Muscle Testing (MMT) Assessment RUVARGHESE deferredHAMMAD grossly WFL  -       Row Name 12/26/24 1534          Elbow/Forearm (Therapeutic Exercise)    Elbow/Forearm (Therapeutic Exercise) AAROM (active assistive range of motion)  -     Elbow/Forearm AAROM (Therapeutic Exercise) right;flexion;extension;pronation;supination;10 repetitions  -       Row Name 12/26/24 1534          Wrist (Therapeutic Exercise)    Wrist (Therapeutic Exercise) AAROM (active assistive range of motion)  -     Wrist AAROM (Therapeutic Exercise) right;flexion;extension;10 repetitions  -       Row Name 12/26/24 1534          Hand (Therapeutic Exercise)    Hand (Therapeutic Exercise) AROM (active range of motion)  -     Hand AROM/AAROM (Therapeutic Exercise) right;AAROM (active assistive range of motion);finger flexion;finger extension;10 repetitions  -       Row Name 12/26/24 1534          Motor Skills    Therapeutic Exercise elbow/forearm;wrist;hand  -       Row Name 12/26/24 1534          Balance    Balance Assessment sitting static balance;sitting dynamic balance;standing static balance;standing dynamic balance  -     Static Sitting Balance standby assist  -     Dynamic Sitting Balance standby assist  -     Position, Sitting Balance sitting in chair  -     Static Standing Balance contact guard  -     Dynamic Standing Balance minimal assist  -     Position/Device Used, Standing Balance unsupported  -     Balance Interventions sitting;standing;dynamic;static;dynamic reaching;occupation based/functional task;weight shifting activity  -               User Key  (r) = Recorded By, (t) = Taken By, (c) = Cosigned By      Initials Name Provider Type    CS Gina Pineda OT Occupational Therapist                    Goals/Plan       Row Name 12/26/24 1559          Transfer Goal 1 (OT)    Activity/Assistive Device (Transfer Goal 1, OT) sit-to-stand/stand-to-sit;toilet  -CS     [place] Level/Cues Needed (Transfer Goal 1, OT) standby assist  -CS     Time Frame (Transfer Goal 1, OT) long term goal (LTG);2 days  -CS     Progress/Outcome (Transfer Goal 1, OT) goal ongoing  -CS       Row Name 12/26/24 1559          Bathing Goal 1 (OT)    Activity/Device (Bathing Goal 1, OT) bathing skills, all  -CS     [place] Level/Cues Needed (Bathing Goal 1, OT) standby assist  -CS     Time Frame (Bathing Goal 1, OT) short term goal (STG);1 day  -CS     Strategies/Barriers (Bathing Goal 1, OT) axilla care  -CS     Progress/Outcomes (Bathing Goal 1, OT) goal ongoing  -CS       Row Name 12/26/24 1559          Dressing Goal 1 (OT)    Activity/Device (Dressing Goal 1, OT) upper body dressing  -CS     [place]/Cues Needed (Dressing Goal 1, OT) moderate assist (50-74% patient effort)  -CS     Time Frame (Dressing Goal 1, OT) long term goal (LTG);2 days  -CS     Strategies/Barriers (Dressing Goal 1, OT) don/doff sling  -CS     Progress/Outcome (Dressing Goal 1, OT) goal ongoing  -CS       Row Name 12/26/24 1559          Therapy Assessment/Plan (OT)    Planned Therapy Interventions (OT) activity tolerance training;adaptive equipment training;BADL retraining;functional balance retraining;occupation/activity based interventions;ROM/therapeutic exercise;strengthening exercise;transfer/mobility retraining;patient/caregiver education/training  -CS               User Key  (r) = Recorded By, (t) = Taken By, (c) = Cosigned By      Initials Name Provider Type    CS Gina Pineda OT Occupational Therapist                   Clinical Impression       Row Name 12/26/24 1536          Pain Assessment    Pretreatment Pain Rating 0/10 - no pain  -CS     Posttreatment Pain Rating 0/10 - no pain  -CS       Row Name 12/26/24 1536          Plan of Care Review     Plan of Care Reviewed With patient  -CS     Progress improving  -CS     Outcome Evaluation OT eval complete. Pt presents w/ mild balance deficits and decreased functional endurance warranting cont skilled IPOT POC. Pt/s.o. educated on precautions, sling wear schedule/adjustment/fit/don/doff,  NWB status, HEP, nerve cath care, AE/DME use, compensatory strategies for ADL performance, and home safety. Pt with noted desat to 83% on RA and required 3LNC to recover to WNL w/in 2 mins. Pt w/ mild balance deficits and would benefit from PT evaluation. Recommend pt DC home w/ assist and HH OT/PT services.  -CS       Row Name 12/26/24 1536          Therapy Assessment/Plan (OT)    Patient/Family Therapy Goal Statement (OT) Return to PLOF  -CS     Rehab Potential (OT) good  -CS     Criteria for Skilled Therapeutic Interventions Met (OT) yes;skilled treatment is necessary  -CS     Therapy Frequency (OT) daily  -CS     Predicted Duration of Therapy Intervention (OT) 2 days  -CS       Row Name 12/26/24 1536          Therapy Plan Review/Discharge Plan (OT)    Anticipated Discharge Disposition (OT) home with assist;home with home health  -CS       Row Name 12/26/24 1536          Vital Signs    Pretreatment Heart Rate (beats/min) 96  -CS     Intratreatment Heart Rate (beats/min) 108  -CS     Posttreatment Heart Rate (beats/min) 88  -CS     Pre SpO2 (%) 96  -CS     O2 Delivery Pre Treatment nasal cannula  -CS     Intra SpO2 (%) 83  -CS     O2 Delivery Intra Treatment room air  -CS     Post SpO2 (%) 96  -CS     O2 Delivery Post Treatment nasal cannula  -CS     Pre Patient Position Sitting  -CS     Intra Patient Position Standing  -CS     Post Patient Position Sitting  -CS       Row Name 12/26/24 1536          Positioning and Restraints    Pre-Treatment Position sitting in chair/recliner  -CS     Post Treatment Position chair  -CS     In Chair notified nsg;reclined;call light within reach;encouraged to call for assist;exit alarm  on;RUE elevated;LUE elevated;waffle cushion;legs elevated;compression device  -CS               User Key  (r) = Recorded By, (t) = Taken By, (c) = Cosigned By      Initials Name Provider Type    CS Gina Pineda, ALEE Occupational Therapist                   Outcome Measures       Row Name 12/26/24 1601          How much help from another is currently needed...    Putting on and taking off regular lower body clothing? 2  -CS     Bathing (including washing, rinsing, and drying) 2  -CS     Toileting (which includes using toilet bed pan or urinal) 3  -CS     Putting on and taking off regular upper body clothing 3  -CS     Taking care of personal grooming (such as brushing teeth) 3  -CS     Eating meals 4  -CS     AM-PAC 6 Clicks Score (OT) 17  -CS       Row Name 12/26/24 1601 12/26/24 1330       How much help from another person do you currently need...    Turning from your back to your side while in flat bed without using bedrails? 2  -CS 3  -TK    Moving from lying on back to sitting on the side of a flat bed without bedrails? 2  -CS 3  -TK    Moving to and from a bed to a chair (including a wheelchair)? 3  -CS 4  -TK    Standing up from a chair using your arms (e.g., wheelchair, bedside chair)? 3  -CS 4  -TK    Climbing 3-5 steps with a railing? 3  -CS 3  -TK    To walk in hospital room? 3  -CS 3  -TK    AM-PAC 6 Clicks Score (PT) 16  -CS 20  -TK      Row Name 12/26/24 1324          How much help from another person do you currently need...    Turning from your back to your side while in flat bed without using bedrails? 3  -TK     Moving from lying on back to sitting on the side of a flat bed without bedrails? 3  -TK     Moving to and from a bed to a chair (including a wheelchair)? 4  -TK     Standing up from a chair using your arms (e.g., wheelchair, bedside chair)? 4  -TK     Climbing 3-5 steps with a railing? 3  -TK     To walk in hospital room? 3  -TK     AM-PAC 6 Clicks Score (PT) 20  -TK       Row Name 12/26/24  1601          Functional Assessment    Outcome Measure Options AM-PAC 6 Clicks Daily Activity (OT)  -CS               User Key  (r) = Recorded By, (t) = Taken By, (c) = Cosigned By      Initials Name Provider Type    CS Gina Pineda OT Occupational Therapist    Marisela Costa RN Registered Nurse                    Occupational Therapy Education       Title: PT OT SLP Therapies (In Progress)       Topic: Occupational Therapy (In Progress)       Point: ADL training (Done)       Description:   Instruct learner(s) on proper safety adaptation and remediation techniques during self care or transfers.   Instruct in proper use of assistive devices.                  Learning Progress Summary            Patient Acceptance, E, VU by  at 12/26/2024 1602   Significant Other Acceptance, E, VU by CS at 12/26/2024 1602                      Point: Home exercise program (Not Started)       Description:   Instruct learner(s) on appropriate technique for monitoring, assisting and/or progressing therapeutic exercises/activities.                  Learner Progress:  Not documented in this visit.              Point: Precautions (Done)       Description:   Instruct learner(s) on prescribed precautions during self-care and functional transfers.                  Learning Progress Summary            Patient Acceptance, E, VU by CS at 12/26/2024 1602   Significant Other Acceptance, E, VU by CS at 12/26/2024 1602                      Point: Body mechanics (Done)       Description:   Instruct learner(s) on proper positioning and spine alignment during self-care, functional mobility activities and/or exercises.                  Learning Progress Summary            Patient Acceptance, E, VU by CS at 12/26/2024 1602   Significant Other Acceptance, E, VU by CS at 12/26/2024 1602                                      User Key       Initials Effective Dates Name Provider Type Discipline     09/02/21 -  Gina Pineda OT Occupational  Therapist OT                  OT Recommendation and Plan  Planned Therapy Interventions (OT): activity tolerance training, adaptive equipment training, BADL retraining, functional balance retraining, occupation/activity based interventions, ROM/therapeutic exercise, strengthening exercise, transfer/mobility retraining, patient/caregiver education/training  Therapy Frequency (OT): daily  Plan of Care Review  Plan of Care Reviewed With: patient  Progress: improving  Outcome Evaluation: OT eval complete. Pt presents w/ mild balance deficits and decreased functional endurance warranting cont skilled IPOT POC. Pt/s.o. educated on precautions, sling wear schedule/adjustment/fit/don/doff,  NWB status, HEP, nerve cath care, AE/DME use, compensatory strategies for ADL performance, and home safety. Pt with noted desat to 83% on RA and required 3LNC to recover to WNL w/in 2 mins. Pt w/ mild balance deficits and would benefit from PT evaluation. Recommend pt DC home w/ assist and HH OT/PT services.     Time Calculation:   Evaluation Complexity (OT)  Review Occupational Profile/Medical/Therapy History Complexity: expanded/moderate complexity  Assessment, Occupational Performance/Identification of Deficit Complexity: 5 or more performance deficits  Clinical Decision Making Complexity (OT): detailed assessment/moderate complexity  Overall Complexity of Evaluation (OT): moderate complexity     Time Calculation- OT       Row Name 12/26/24 1603             Time Calculation- OT    OT Start Time 1430  -CS      OT Received On 12/26/24  -CS      OT Goal Re-Cert Due Date 01/05/25  -CS         Timed Charges    89100 - OT Therapeutic Exercise Minutes 10  -CS      94777 - OT Therapeutic Activity Minutes 15  -CS      75069 - OT Self Care/Mgmt Minutes 20  -CS         Untimed Charges    OT Eval/Re-eval Minutes 46  -CS         Total Minutes    Timed Charges Total Minutes 45  -CS      Untimed Charges Total Minutes 46  -CS       Total Minutes 91   -                User Key  (r) = Recorded By, (t) = Taken By, (c) = Cosigned By      Initials Name Provider Type    CS Gina Pineda OT Occupational Therapist                  Therapy Charges for Today       Code Description Service Date Service Provider Modifiers Qty    09754824382 HC OT THER PROC EA 15 MIN 12/26/2024 Gina Pineda OT GO 1    59901865485 HC OT THERAPEUTIC ACT EA 15 MIN 12/26/2024 Gina Pineda OT GO 1    73956102435 HC OT SELF CARE/MGMT/TRAIN EA 15 MIN 12/26/2024 Gina Pineda OT GO 1    37394408530 HC OT EVAL MOD COMPLEXITY 4 12/26/2024 Gina Pineda OT GO 1    46307895910 HC OT THER SUPP EA 15 MIN 12/26/2024 Gina Pineda OT GO 2                 Gina Pineda OT  12/26/2024

## 2024-12-26 NOTE — OP NOTE
Operative Report Reverse Total Shoulder Arthroplasty    DATE OF OPERATION: 12/26/24    PREOPERATIVE DIAGNOSIS: right shoulder rotator cuff arthropathy      POSTOPERATIVE DIAGNOSES:  1. right shoulder rotator cuff arthropathy       PROCEDURES PERFORMED:  1. right reverse total shoulder arthroplasty.        SURGEON: Reji Maki MD    Assistant: Luís Hagen PA-C  ** Please note the physician assistant was medically necessary to assist with positioning retraction, arm positioning, care of soft tissues and closure      ANESTHESIA: General plus block.      ESTIMATED BLOOD LOSS:150mL.      COMPLICATIONS: None.      DISPOSITION: Recovery room in stable condition.      IMPLANTS:  Tornier reverse total shoulder system  Humeral Stem: 2+, long  Humeral Tray: as above  Polyethylene Liner: 36+0 10 deg medial lip  Baseplate: 25 full wedge with 35mm central screw  Glenosphere: 36 std     INDICATIONS: This is a 59-year-old female with right shoulder pain and limited function and motion secondary to above. They have failed conservative treatment and after a discussion of risks, benefits, and alternatives, wished to proceed with shoulder arthroplasty.    DESCRIPTION OF PROCEDURE: On the day of surgery, the patient identified the right shoulder as the correct operative extremity. This was initialed by the surgeon with the patients's acknowledgment. The patient underwent placement of an interscalene block and was taken to the operating room and placed in the supine position. Upon induction of adequate anesthesia, the patient was brought up to the beach chair position and the shoulder and upper extremity were prepped and draped in the usual sterile fashion. Timeout confirmed the correct patient and operative extremity as well as that antibiotics were on board. A standard deltopectoral approach to the shoulder was carried out. It was carried sharply through the skin and subcutaneous tissue. Medial and lateral flaps were developed  over the deltopectoral fascia. The cephalic vein was identified and mobilized laterally with the deltoid. The subdeltoid and subpectoral spaces were mobilized and a blunt retractor was placed deep to this. The clavipectoral fascia was opened on the lateral edge of the conjoined tendon and the retractor was moved deep to this. The leading edge of the pectoralis was released exposing the long head of the biceps. This was tenosynovitic and therefore tenotomized. The 3 sisters were identified and coagulated. A subscapularis tenotomy was performed and rotator interval was released to the glenoid exposing the humeral head. The inferior capsule was released directly off the humerus to allow greater than 90° of external rotation. The anatomic neck was exposed and the humeral head osteotomy was performed in approximately 20° of retroversion. The remainder of the osteophytes were removed. The canal was then entered, reamed, and broached. The final stem impacted in in approximately 20° of retroversion. A head protector was placed. The humerus was subluxed posteriorly. The glenoid exposed. Circumferential labral excision and capsular release were performed. A  mobilization of the subscapularis was carried out as well.  A centering hole was drilled. The glenoid was gently reamed and then the  central hole for the baseplate was drilled  glenoid baseplate inserted.  Screws were then placed through the baseplate  The glenosphere was then inserted and locked into place with a set screw.  The humerus was carefully subluxed back anteriorly. A liner tray and polyethylene were placed and trialing was carried out. The appropriate final sizes were chosen and locked into place.  The shoulder was then reduced.  This allowed nearly full passive range of motion with no instability. The joint was copiously irrigated with orthopedic irrigation mixed with Betadine after the final implants were assembled and locked into place.     vancomycin  powder was placed in the wound      The deltopectoral interval was approximated with 0 Vicryl, the subcutaneous tissue with 2-0 Vicryl, and the skin with nylon. A sterile dressing was placed. Anesthesia was reversed and the patient was taken to the recovery room in stable condition. All instrument, needle, and sponge counts were correct.      Reji Maki MD, MS

## 2024-12-26 NOTE — PLAN OF CARE
Goal Outcome Evaluation:  Plan of Care Reviewed With: patient        Progress: improving  Outcome Evaluation: OT eval complete. Pt presents w/ mild balance deficits and decreased functional endurance warranting cont skilled IPOT POC. Pt/s.o. educated on precautions, sling wear schedule/adjustment/fit/don/doff,  NWB status, HEP, nerve cath care, AE/DME use, compensatory strategies for ADL performance, and home safety. Pt with noted desat to 83% on RA and required 3LNC to recover to WNL w/in 2 mins. Pt w/ mild balance deficits and would benefit from PT evaluation. Recommend pt DC home w/ assist and HH OT/PT services.    Anticipated Discharge Disposition (OT): home with assist, home with home health

## 2024-12-27 VITALS
HEIGHT: 66 IN | SYSTOLIC BLOOD PRESSURE: 148 MMHG | DIASTOLIC BLOOD PRESSURE: 80 MMHG | WEIGHT: 164 LBS | OXYGEN SATURATION: 94 % | BODY MASS INDEX: 26.36 KG/M2 | HEART RATE: 94 BPM | RESPIRATION RATE: 18 BRPM | TEMPERATURE: 98.8 F

## 2024-12-27 PROBLEM — M12.811 ROTATOR CUFF ARTHROPATHY OF RIGHT SHOULDER: Status: RESOLVED | Noted: 2024-12-26 | Resolved: 2024-12-27

## 2024-12-27 LAB
ANION GAP SERPL CALCULATED.3IONS-SCNC: 11 MMOL/L (ref 5–15)
BASOPHILS # BLD AUTO: 0.01 10*3/MM3 (ref 0–0.2)
BASOPHILS NFR BLD AUTO: 0.1 % (ref 0–1.5)
BUN SERPL-MCNC: 16 MG/DL (ref 8–23)
BUN/CREAT SERPL: 25 (ref 7–25)
CALCIUM SPEC-SCNC: 9.3 MG/DL (ref 8.6–10.5)
CHLORIDE SERPL-SCNC: 105 MMOL/L (ref 98–107)
CO2 SERPL-SCNC: 25 MMOL/L (ref 22–29)
CREAT SERPL-MCNC: 0.64 MG/DL (ref 0.57–1)
DEPRECATED RDW RBC AUTO: 48.7 FL (ref 37–54)
EGFRCR SERPLBLD CKD-EPI 2021: 100.7 ML/MIN/1.73
EOSINOPHIL # BLD AUTO: 0 10*3/MM3 (ref 0–0.4)
EOSINOPHIL NFR BLD AUTO: 0 % (ref 0.3–6.2)
ERYTHROCYTE [DISTWIDTH] IN BLOOD BY AUTOMATED COUNT: 14.2 % (ref 12.3–15.4)
GLUCOSE SERPL-MCNC: 180 MG/DL (ref 65–99)
HCT VFR BLD AUTO: 35.8 % (ref 34–46.6)
HGB BLD-MCNC: 11.5 G/DL (ref 12–15.9)
IMM GRANULOCYTES # BLD AUTO: 0.06 10*3/MM3 (ref 0–0.05)
IMM GRANULOCYTES NFR BLD AUTO: 0.6 % (ref 0–0.5)
LYMPHOCYTES # BLD AUTO: 0.47 10*3/MM3 (ref 0.7–3.1)
LYMPHOCYTES NFR BLD AUTO: 4.4 % (ref 19.6–45.3)
MCH RBC QN AUTO: 30.2 PG (ref 26.6–33)
MCHC RBC AUTO-ENTMCNC: 32.1 G/DL (ref 31.5–35.7)
MCV RBC AUTO: 94 FL (ref 79–97)
MONOCYTES # BLD AUTO: 1.08 10*3/MM3 (ref 0.1–0.9)
MONOCYTES NFR BLD AUTO: 10.1 % (ref 5–12)
NEUTROPHILS NFR BLD AUTO: 84.8 % (ref 42.7–76)
NEUTROPHILS NFR BLD AUTO: 9.1 10*3/MM3 (ref 1.7–7)
NRBC BLD AUTO-RTO: 0 /100 WBC (ref 0–0.2)
PLATELET # BLD AUTO: 281 10*3/MM3 (ref 140–450)
PMV BLD AUTO: 10.4 FL (ref 6–12)
POTASSIUM SERPL-SCNC: 4.1 MMOL/L (ref 3.5–5.2)
RBC # BLD AUTO: 3.81 10*6/MM3 (ref 3.77–5.28)
SODIUM SERPL-SCNC: 141 MMOL/L (ref 136–145)
WBC NRBC COR # BLD AUTO: 10.72 10*3/MM3 (ref 3.4–10.8)

## 2024-12-27 PROCEDURE — 99238 HOSP IP/OBS DSCHRG MGMT 30/<: CPT | Performed by: STUDENT IN AN ORGANIZED HEALTH CARE EDUCATION/TRAINING PROGRAM

## 2024-12-27 PROCEDURE — 97530 THERAPEUTIC ACTIVITIES: CPT

## 2024-12-27 PROCEDURE — 80048 BASIC METABOLIC PNL TOTAL CA: CPT | Performed by: ORTHOPAEDIC SURGERY

## 2024-12-27 PROCEDURE — 85025 COMPLETE CBC W/AUTO DIFF WBC: CPT | Performed by: ORTHOPAEDIC SURGERY

## 2024-12-27 PROCEDURE — 97116 GAIT TRAINING THERAPY: CPT

## 2024-12-27 PROCEDURE — 25010000002 CEFAZOLIN PER 500 MG: Performed by: ORTHOPAEDIC SURGERY

## 2024-12-27 PROCEDURE — 97161 PT EVAL LOW COMPLEX 20 MIN: CPT

## 2024-12-27 PROCEDURE — 97110 THERAPEUTIC EXERCISES: CPT

## 2024-12-27 PROCEDURE — 97535 SELF CARE MNGMENT TRAINING: CPT

## 2024-12-27 RX ADMIN — PAROXETINE HYDROCHLORIDE 30 MG: 20 TABLET, FILM COATED ORAL at 09:29

## 2024-12-27 RX ADMIN — HYDROCODONE BITARTRATE AND ACETAMINOPHEN 1 TABLET: 5; 325 TABLET ORAL at 13:18

## 2024-12-27 RX ADMIN — PANTOPRAZOLE SODIUM 40 MG: 40 TABLET, DELAYED RELEASE ORAL at 05:17

## 2024-12-27 RX ADMIN — SODIUM CHLORIDE 2000 MG: 900 INJECTION INTRAVENOUS at 02:00

## 2024-12-27 RX ADMIN — DILTIAZEM HYDROCHLORIDE 240 MG: 240 CAPSULE, EXTENDED RELEASE ORAL at 09:29

## 2024-12-27 NOTE — PLAN OF CARE
Goal Outcome Evaluation:  Plan of Care Reviewed With: patient        Progress: no change  Outcome Evaluation: PT initial evaluation complete. Pt presents w/ impaired balance and decreased activity tolerance. Pt ambulated OOR w/ minAx1 and HHA however desatted to 87% requiring 1 standing rest break. In addition pt complete 1 step w/ SPC and CGAx2 w/o any LOB or buckling. Continue to progress POC as able. D/c rec is home w/ assist and HHPT/OT    Anticipated Discharge Disposition (PT): home with assist, home with home health

## 2024-12-27 NOTE — DISCHARGE INSTRUCTIONS
InfuBLOCK - Patient Information    What is a pain pump?  The InfuBLOCK pump delivers post-operative, non-narcotic, numbing medication to the nerve near the surgical site for pain relief.     Where can I find information about my pain pump?           For more information about your pain pump, scan the QR code.  For additional patient resources, visit [employer]/resources-pain-management.                                                                                               While your physician is your primary source for information about your treatment there may be times during your treatment that you need assistance with your infusion pump.     If you need assistance take the following steps:    The [employer] Nursing Hotline is Here for You 24/7.  Please call [phone] for the following concerns or complications:    Answers to questions about your infusion pump                 Tubing disconnect  Assistance with pump alarms                                                      Dislodged catheter  Excessive leakage noted from pump                                         Inadequate pain control    2.   [facility] Anesthesia Acute Pain Service: [phone] is available 24/7 for any further needs or concerns about medication or pain control.     -------------------------------------------------------------------------    Nerve Catheter Removal Instructions  When your device is empty:    Remove your catheter by pulling the dressing off slowly (like you would remove a regular bandage). The catheter should pull right out of the skin.  Check that the BLUE tip is intact.                                                                                     If the catheter is stuck, reposition your   extremity and pull slowly until removed.  *If catheter is HURTING and WON'T come out, stop and call [phone] for further assistance.    Remove medication bag from the black carrying case.  Cut the  tubing on right and left side of pump, and discard the medication bag and tubing into garbage.  Place the pump and black carrying case into the plastic bag and then place this into the return box.  Seal box with blue stickers and return to [employer] postal service. THIS IS PRE-PAID POSTAGE.        -------------------------------------------------------------------------    [facility] COLD THERAPY - PATIENT INSTRUCTION SHEET    Cold Compression Therapy for your comfort and rehabilitation  Your caregivers want you to be productive in your rehab and comfortable during your stay. In keeping with those goals, you will be receiving an SMI Cold Therapy Wrap to help ease post-operative pain and swelling that might keep you from getting back on track! Your SMI Cold Therapy Wrap is effective and simple-to-use, and you will be encouraged to apply it throughout your hospital stay and at home through the duration of your recovery.    When you are ready to go home  Be sure to take your SMI Cold Therapy Wrap and both sets of Gel Bags with you for continued comfort and use throughout your rehabilitation. If you don't already have them, ask your nurse or aide to retrieve your SMI Gel Bags from the patient freezer.    Home use precautions  Always follow your medical professional's application instructions upon discharge. Your SMI Cold Therapy Wrap and Gel Bags are designed to last for months following your surgery. Never heat the Gel Bags unless specified by your healthcare provider. Supervision is advised when using this product on children or geriatric patients. To avoid danger of suffocation, please keep the outer plastic packaging away from children & pets.    Cold Therapy Instructions  Place Gel Bags in a freezer set ¾ of the way to max temperature for at least (4) hours. For best results, lay the Gel Bags flat and [id number] in the freezer. Once frozen, slide Gel Bags into the gel pouch and secure your wrap to the affected area with the  straps.  Gel wraps that have been stored in a freezer for an extended period of time may require a (10) minute period of softening up in a room temperature environment before application.  The gel pouch acts as a protective barrier. NEVER place frozen bags directly onto skin, as this may cause frostbite injury.  The [facility] Cold Therapy Wrap is designed to be able to be worm while ambulating. The compression straps can be secured well enough so that the Wrap won't fall off while moving.  Wrap Application Videos can be viewed at [email or web address].  An additional protective barrier such as clothing, a washcloth, hand-towel or pillowcase may be used during prolonged treatment applications.  The Gel-Pouch and Wrap are both Latex-Free and the Gel Bag ingredients are non toxic.    [facility] Wrap care instructions  The [facility] Cold Therapy Wrap may be hand washed and hung to dry when needed.    [facility] re-order information  Additional [facility] body specific wraps and/or Gel Bags can be re-ordered from [email or web address] or call [employer] ([phone])

## 2024-12-27 NOTE — THERAPY TREATMENT NOTE
Patient Name: Esther Cooper  : 1965    MRN: [id number]                              Today's Date: 2024       Admit Date: 2024    Visit Dx: No diagnosis found.  Patient Active Problem List   Diagnosis    Essential hypertension    Mixed hyperlipidemia    Chronic obstructive pulmonary disease    Shortness of breath    Obesity with body mass index of 30.0-39.9     Past Medical History:   Diagnosis Date    Anxiety     Asthma     COPD (chronic obstructive pulmonary disease)     COVID 2021    Depression     GERD (gastroesophageal reflux disease)     History of transfusion     AT , PATIENT DENIES REACTION    Hypertension     Hypoglycemia     Insomnia     Mixed hyperlipidemia 2017    MRSA (methicillin resistant Staphylococcus aureus)     RIGHT SURGICAL SITE AREA, CHEST TUBE LOCATION    Non-small cell cancer of upper lobe of lung     Osteopenia of left hip     Status post right rotator cuff repair 10/2023     Past Surgical History:   Procedure Laterality Date    CARDIAC CATHETERIZATION      Normal as per pt    CARDIOVASCULAR STRESS TEST  2018    Lexiscan- no infarct or ischemia.     COLONOSCOPY      ECHO - CONVERTED  2018    EF 65%. Mod Pericardial Effusion    ECHO - CONVERTED  11/10/2021    EF 60%. LA- 3.7 Cm. Mild MR. RVSP- 15 mmHg. Mod Pericardial Effusion    ECHO - CONVERTED  2023    EF 60%. LA- 3.9. Trace-Mild MR. RVSP- 17 mmHg. Mod PE    EYE SURGERY Bilateral     CATARACTS    LUNG LOBECTOMY Right     right upper lobe    OTHER SURGICAL HISTORY  2019    Biotel cardiac monitor -19- baseline sinus, no arrhythmia    SHOULDER ARTHROSCOPY W/ ROTATOR CUFF REPAIR Left 2021    Procedure: SHOULDER SCOPE, MAJOR DEBRIDMENT WITH BICEPS TENOTOMY LEFT;  Surgeon: Reji Maki MD;  Location: [facility];  Service: Orthopedics;  Laterality: Left;    TONSILLECTOMY        General Information       Row Name 24 6222          OT Time and  Intention    Document Type therapy note (daily note)  -MR     Mode of Treatment occupational therapy  -MR       Row Name 12/27/24 1425          General Information    Patient Profile Reviewed yes  -MR     Existing Precautions/Restrictions fall;oxygen therapy device and L/min;other (see comments);right;shoulder  s/p R RTSA, nerve cath, elbow/wrist/hand ROM only, sling on AAT, RUE NWB  -MR     Barriers to Rehab medically complex  -MR       Row Name 12/27/24 1425          Cognition    Orientation Status (Cognition) oriented x 4  -MR       Row Name 12/27/24 1425          Safety Issues/Impairments Affecting Functional Mobility    Safety Issues Affecting Function (Mobility) insight into deficits/self-awareness;awareness of need for assistance  -MR     Impairments Affecting Function (Mobility) balance;endurance/activity tolerance;strength  -MR               User Key  (r) = Recorded By, (t) = Taken By, (c) = Cosigned By      Initials Name Provider Type    MR Rosalina Whitt, OT Occupational Therapist                     Mobility/ADL's       Row Name 12/27/24 1426          Transfers    Transfers sit-stand transfer;toilet transfer  -MR       Row Name 12/27/24 1426          Sit-Stand Transfer    Sit-Stand [place] (Transfers) contact guard;verbal cues  -MR       Row Name 12/27/24 1426          Toilet Transfer    Type (Toilet Transfer) sit-stand;stand-sit  -MR     [place] Level (Toilet Transfer) contact guard;verbal cues  -MR     Assistive Device (Toilet Transfer) commode  -MR       Row Name 12/27/24 1426          Functional Mobility    Functional Mobility- Ind. Level contact guard assist;verbal cues required  -MR     Functional Mobility-Distance (Feet) --  HH distances w/in pt's room  -MR       Row Name 12/27/24 1426          Activities of Daily Living    BADL Assessment/Intervention bathing;upper body dressing;lower body dressing  -MR       Row Name 12/27/24 1426          Mobility    Extremity Weight-bearing Status  right upper extremity  -MR     Right Upper Extremity (Weight-bearing Status) non weight-bearing (NWB)  -MR       Row Name 12/27/24 1426          Upper Body Dressing Assessment/Training    [place] Level (Upper Body Dressing) don;doff;moderate assist (50% patient effort);front opening garment;other (see comments)  sling  -MR     Position (Upper Body Dressing) unsupported sitting  -MR     Comment, (Upper Body Dressing) Front button shirt and sling. Pt and spouse educated on sequencing and safety w/ UB dressing and nerve cath management.  -MR       Row Name 12/27/24 1426          Bathing Assessment/Intervention    [place] Level (Bathing) bathing skills  -MR     Comment, (Bathing) Pt and spouse educated on axilla hygiene and waiting to shower once nerve cath has been discontinued.  -MR       Row Name 12/27/24 1426          Lower Body Dressing Assessment/Training    [place] Level (Lower Body Dressing) don;minimum assist (75% patient effort)  -MR     Position (Lower Body Dressing) unsupported sitting;unsupported standing  -MR     Comment, (Lower Body Dressing) Spouse assisted pt w/ donning of pants, socks and shoes.  -MR               User Key  (r) = Recorded By, (t) = Taken By, (c) = Cosigned By      Initials Name Provider Type    Rosalina Gray, OT Occupational Therapist                   Obj/Interventions       Row Name 12/27/24 1437          Vision Assessment/Intervention    Visual Impairment/Limitations WFL  -MR       Row Name 12/27/24 1437          Elbow/Forearm (Therapeutic Exercise)    Elbow/Forearm (Therapeutic Exercise) AAROM (active assistive range of motion);AROM (active range of motion)  -MR     Elbow/Forearm AROM (Therapeutic Exercise) right;supination;pronation;10 repetitions  -MR     Elbow/Forearm AAROM (Therapeutic Exercise) right;flexion;extension;sitting;10 repetitions;left assist right  -MR       Row Name 12/27/24 1437          Wrist (Therapeutic Exercise)    Wrist (Therapeutic  Exercise) AAROM (active assistive range of motion)  -MR     Wrist AAROM (Therapeutic Exercise) right;flexion;extension;10 repetitions  -MR       Row Name 12/27/24 1437          Hand (Therapeutic Exercise)    Hand (Therapeutic Exercise) AROM (active range of motion)  -MR     Hand AROM/AAROM (Therapeutic Exercise) right;finger flexion;finger extension;AROM (active range of motion);10 repetitions  -MR       Row Name 12/27/24 1437          Motor Skills    Therapeutic Exercise elbow/forearm;wrist;hand  -MR       Row Name 12/27/24 1437          Balance    Balance Assessment sitting static balance;sitting dynamic balance;standing static balance;standing dynamic balance  -MR     Static Sitting Balance standby assist  -MR     Dynamic Sitting Balance standby assist  -MR     Position, Sitting Balance unsupported;sitting in chair  -MR     Static Standing Balance contact guard  -MR     Dynamic Standing Balance minimal assist  -MR     Position/Device Used, Standing Balance supported  -MR     Balance Interventions sitting;standing;sit to stand;supported;static;dynamic;occupation based/functional task  -MR               User Key  (r) = Recorded By, (t) = Taken By, (c) = Cosigned By      Initials Name Provider Type    MR Rosalina Whitt OT Occupational Therapist                   Goals/Plan    No documentation.                  Clinical Impression       Row Name 12/27/24 1442          Pain Assessment    Pretreatment Pain Rating 0/10 - no pain  -MR     Posttreatment Pain Rating 0/10 - no pain  -MR     Pain Side/Orientation right  -MR       Row Name 12/27/24 1442          Plan of Care Review    Plan of Care Reviewed With patient  -MR     Progress no change  -MR     Outcome Evaluation Pt and spouse educated on R shoulder precautions, sling management, nerve cath management, R axilla hygiene and HEP w/in surgeon parameters. Pt tolerated ther ex well this date. Spouse assisted w/ sling management, UB and LB dressing. No overt LOB noted  w/ mobility to and from restroom w/ CGA. Continue per current POC.  -MR       Row Name 12/27/24 1442          Therapy Plan Review/Discharge Plan (OT)    Anticipated Discharge Disposition (OT) home with assist;home with home health  -MR       Row Name 12/27/24 1442          Vital Signs    O2 Delivery Pre Treatment nasal cannula  -MR     O2 Delivery Intra Treatment nasal cannula  -MR     O2 Delivery Post Treatment nasal cannula  -MR     Pre Patient Position Sitting  -MR     Intra Patient Position Standing  -MR     Post Patient Position Sitting  -MR       Row Name 12/27/24 1442          Positioning and Restraints    Pre-Treatment Position sitting in chair/recliner  -MR     Post Treatment Position chair  -MR     In Chair notified nsg;sitting;call light within reach;encouraged to call for assist;exit alarm on;with family/caregiver;waffle cushion;RUE elevated  RUE in sling w/ pillow under RUE  -MR               User Key  (r) = Recorded By, (t) = Taken By, (c) = Cosigned By      Initials Name Provider Type    MR Rosalina Whitt, OT Occupational Therapist                   Outcome Measures       Row Name 12/27/24 1445          How much help from another is currently needed...    Putting on and taking off regular lower body clothing? 2  -MR     Bathing (including washing, rinsing, and drying) 2  -MR     Toileting (which includes using toilet bed pan or urinal) 3  -MR     Putting on and taking off regular upper body clothing 3  -MR     Taking care of personal grooming (such as brushing teeth) 3  -MR     Eating meals 4  -MR     AM-PAC 6 Clicks Score (OT) 17  -MR       Row Name 12/27/24 1141 12/27/24 0929       How much help from another person do you currently need...    Turning from your back to your side while in flat bed without using bedrails? 3  -AC 3  -AP    Moving from lying on back to sitting on the side of a flat bed without bedrails? 3  -AC 3  -AP    Moving to and from a bed to a chair (including a wheelchair)? 3  -AC  3  -AP    Standing up from a chair using your arms (e.g., wheelchair, bedside chair)? 3  -AC 3  -AP    Climbing 3-5 steps with a railing? 2  -AC 3  -AP    To walk in hospital room? 3  -AC 3  -AP    AM-PAC 6 Clicks Score (PT) 17  -AC 18  -AP    Highest Level of Mobility Goal 5 --> Static standing  -AC 6 --> Walk 10 steps or more  -AP      Row Name 12/27/24 1445 12/27/24 1141       Functional Assessment    Outcome Measure Options AM-PAC 6 Clicks Daily Activity (OT)  -MR AM-PAC 6 Clicks Basic Mobility (PT)  -              User Key  (r) = Recorded By, (t) = Taken By, (c) = Cosigned By      Initials Name Provider Type    AP Karina Rosen, RN Registered Nurse    MR Jose Eduardo Rosalina, OT Occupational Therapist    AC Latricia Youngblood, PT Physical Therapist                    Occupational Therapy Education       Title: PT OT SLP Therapies (In Progress)       Topic: Occupational Therapy (Done)       Point: ADL training (Done)       Description:   Instruct learner(s) on proper safety adaptation and remediation techniques during self care or transfers.   Instruct in proper use of assistive devices.                  Learning Progress Summary            Patient Acceptance, E, VU by MR at 12/27/2024 1446    Acceptance, E, VU by CS at 12/26/2024 1602   Significant Other Acceptance, E, VU by CS at 12/26/2024 1602                      Point: Home exercise program (Done)       Description:   Instruct learner(s) on appropriate technique for monitoring, assisting and/or progressing therapeutic exercises/activities.                  Learning Progress Summary            Patient Acceptance, E, VU by MR at 12/27/2024 1446                      Point: Precautions (Done)       Description:   Instruct learner(s) on prescribed precautions during self-care and functional transfers.                  Learning Progress Summary            Patient Acceptance, E, VU by MR at 12/27/2024 1446    Acceptance, E, VU by CS at 12/26/2024 1602   Significant Other  Acceptance, E, VU by  at 12/26/2024 1602                      Point: Body mechanics (Done)       Description:   Instruct learner(s) on proper positioning and spine alignment during self-care, functional mobility activities and/or exercises.                  Learning Progress Summary            Patient Acceptance, E, VU by  at 12/27/2024 1446    Acceptance, E, VU by  at 12/26/2024 1602   Significant Other Acceptance, E, VU by  at 12/26/2024 1602                                      User Key       Initials Effective Dates Name Provider Type Discipline     09/02/21 -  Gina Pineda, OT Occupational Therapist OT     09/22/22 -  Rosalina Whitt, OT Occupational Therapist OT                  OT Recommendation and Plan     Plan of Care Review  Plan of Care Reviewed With: patient  Progress: no change  Outcome Evaluation: Pt and spouse educated on R shoulder precautions, sling management, nerve cath management, R axilla hygiene and HEP w/in surgeon parameters. Pt tolerated ther ex well this date. Spouse assisted w/ sling management, UB and LB dressing. No overt LOB noted w/ mobility to and from restroom w/ CGA. Continue per current POC.     Time Calculation:         Time Calculation- OT       Row Name 12/27/24 1446 12/27/24 1142          Time Calculation- OT    OT Start Time 1117  -MR --     OT Received On 12/27/24  -MR --        Timed Charges    65495 - OT Therapeutic Exercise Minutes 5  -MR --     89362 - Gait Training Minutes  -- 12  -AC     39145 - OT Therapeutic Activity Minutes 15  -MR --     42341 - OT Self Care/Mgmt Minutes 20  -MR --        Total Minutes    Timed Charges Total Minutes 40  -MR 12  -AC      Total Minutes 40  -MR 12  -AC               User Key  (r) = Recorded By, (t) = Taken By, (c) = Cosigned By      Initials Name Provider Type    MR Jose Eduardo Rosalina, OT Occupational Therapist    AC Latricia Youngblood, ELI Physical Therapist                  Therapy Charges for Today       Code Description Service  Date Service Provider Modifiers Qty    08322607765 HC OT THER PROC EA 15 MIN 12/27/2024 Rosalina Whitt OT GO 1    61152636501 HC OT THERAPEUTIC ACT EA 15 MIN 12/27/2024 Rosalina Whitt OT GO 1    [id number]  OT SELF CARE/MGMT/TRAIN EA 15 MIN 12/27/2024 Rosalina Whitt OT GO 1                 Rosalina Whitt OT  12/27/2024

## 2024-12-27 NOTE — PROGRESS NOTES
[facility]    Acute pain service Inpatient Progress Note    Patient Name: Esther Cooper  :  1965  MRN:  [id number]        Acute Pain  Service Inpatient Progress Note:    Analgesia:Good  Pain Score:5/10  LOC: alert and awake  Resp Status: room air  Cardiac: VS stable  Side Effects:None  Catheter Site:clean, dressing intact and dry  Cath type: peripheral nerve cath(InfuSystem)  Infusion rate: Ext/Pop: Basal: 1ml/hr, PIB: 5ml q 2 h, PCA: 5 ml q 30 min (1mL,5ml, 5ml InfuSystem Pump)  Catheter Plan:Catheter to remain Insitu and Continue catheter infusion rate unchanged  Comments: The neuro assessment of the operative extremity includes the ability to do finger flexion and extension; includes the ability to do wrist flexion and extension, includes the ability to do elbow flexion and extension.  The neuro exam of the patient includes sensory function throughout the operative extremity.

## 2024-12-27 NOTE — PLAN OF CARE
Goal Outcome Evaluation:  Plan of Care Reviewed With: patient        Progress: no change  Outcome Evaluation: Pt and spouse educated on R shoulder precautions, sling management, nerve cath management, R axilla hygiene and HEP w/in surgeon parameters. Pt tolerated ther ex well this date. Spouse assisted w/ sling management, UB and LB dressing. No overt LOB noted w/ mobility to and from restroom w/ CGA. Continue per current POC.    Anticipated Discharge Disposition (OT): home with assist, home with home health

## 2024-12-27 NOTE — DISCHARGE SUMMARY
[facility] Medicine Services  DISCHARGE SUMMARY    Patient Name: Esther Cooper  : 1965  MRN: [id number]    Date of Admission: 2024  7:33 AM  Date of Discharge:  2024  Primary Care Physician: Taylor Moeller APRN    Consults       Date and Time Order Name Status Description    2024  1:24 PM Inpatient Consult to Hospitalist Completed             Hospital Course     Presenting Problem: Right shoulder rotator cuff arthropathy    Active Hospital Problems    Diagnosis  POA    Chronic obstructive pulmonary disease [J44.9]  Yes    Essential hypertension [I10]  Yes      Resolved Hospital Problems    Diagnosis Date Resolved POA    **Rotator cuff arthropathy of right shoulder [M12.811] 2024 Yes          Hospital Course:  Esther Cooper is a 59 y.o. female with past medical history significant for COPD, NSCLC status post recent right upper lobe lobectomy, was admitted to undergo a right total shoulder reverse arthroplasty after experiencing chronic pain with limited range of motion.  Dr. Maki performed the arthroplasty on  without complication.  Patient worked with physical therapy and Occupational Therapy postoperatively without difficulty.  She was determined to be stable for discharge with outpatient follow-up in 2 weeks as scheduled with orthopedic surgery.    Right shoulder rotator cuff arthropathy  POD#1 s/p right reverse total shoulder arthroplasty  -Per Dr Maki: Allow elbow wrist and hand range of motion but otherwise should remain in sling.  Dressing to remain in place until follow-up    COPD with chronic hypoxia   HTN  Hx NSCLC s/p RUL Lobectomy   -Resume home medications     Discharge Follow Up Recommendations for outpatient labs/diagnostics:  Follow-up with PCP in 1 week  Follow-up with Dr. Maki in 2 weeks as scheduled    Day of Discharge     HPI:   Patient reports feeling well this morning, indicates pain is controlled.  Worked well  with therapy and would like to go home today.        Vital Signs:   Temp:  [96.5 °F (35.8 °C)-98.8 °F (37.1 °C)] 98.8 °F (37.1 °C)  Heart Rate:  [] 94  Resp:  [12-18] 18  BP: (116-149)/(57-80) 148/80  Flow (L/min) (Oxygen Therapy):  [2-3] 2      Physical Exam:  General appearance: alert, awake, oriented, no acute distress   Cardiovascular: RRR, no murmurs or rubs, radial pulse full 2/4 BL   Respiratory: Decreased breath sounds in right upper lobe region, trace rhonchi in all other lung fields, oxygenating well on 2 L nasal cannula, no respiratory distress  MSK: Right upper extremity in sling, digits warm to touch, cap refill less than 3 seconds  Neuro/CNS: alert and oriented x3, normal speech, right upper extremity strength and sensation intact    Pertinent  and/or Most Recent Results     LAB RESULTS:      Lab 12/27/24  0618   WBC 10.72   HEMOGLOBIN 11.5*   HEMATOCRIT 35.8   PLATELETS 281   NEUTROS ABS 9.10*   IMMATURE GRANS (ABS) 0.06*   LYMPHS ABS 0.47*   MONOS ABS 1.08*   EOS ABS 0.00   MCV 94.0         Lab 12/27/24  0618   SODIUM 141   POTASSIUM 4.1   CHLORIDE 105   CO2 25.0   ANION GAP 11.0   BUN 16   CREATININE 0.64   EGFR 100.7   GLUCOSE 180*   CALCIUM 9.3                         Brief Urine Lab Results       None          Microbiology Results (last 10 days)       ** No results found for the last 240 hours. **            XR Shoulder 1 View Right    Result Date: 12/26/2024  XR SHOULDER 1 VW RIGHT Date of Exam: 12/26/2024 12:02 PM EST Indication: R Reverse TSA Comparison: None available. Findings: There has been placement of a total shoulder arthroplasty in near anatomic alignment. No periprosthetic fracture is identified. Postoperative soft tissue gas is noted.     Impression: Status post total shoulder arthroplasty in near-anatomic alignment, with no evidence of immediate complication. Electronically Signed: Grady Foster MD  12/26/2024 12:44 PM EST  Workstation ID: [id number]    Peripheral  Block    Result Date: 12/26/2024  Sharan Pearson CRNA     12/26/2024  8:57 AM Peripheral Block Patient reassessed immediately prior to procedure Patient location during procedure: OR Start time: 12/26/2024 8:55 AM Reason for block: at surgeon's request and post-op pain management Performed by Anesthesiologist: Bro Paula MD Assisted by: Brandy Pino RN Preanesthetic Checklist Completed: patient identified, IV checked, site marked, risks and benefits discussed, surgical consent, monitors and equipment checked, pre-op evaluation and timeout performed Prep: Pt Position: supine Sterile barriers:cap, gloves, mask and washed/disinfected hands Prep: ChloraPrep Patient monitoring: blood pressure monitoring, continuous pulse oximetry and EKG Procedure Performed under: general Guidance:ultrasound guided and landmark technique Images:still images obtained, printed/placed on chart Laterality:right Block Type:PECS I and PECS II Injection Technique:single-shot Needle Type:short-bevel Needle Gauge:20 G Resistance on Injection: none Medications Used: dexamethasone sodium phosphate injection - Injection  2 mg - 12/26/2024 8:55:00 AM bupivacaine PF (MARCAINE) 0.25 % injection - Injection  30 mL - 12/26/2024 8:55:00 AM Medications Preservative Free Saline:10ml Comment:Block Injection:  Total volume of LA divided between Right and Left sided blocks   Post Assessment Injection Assessment: negative aspiration for heme, incremental injection and no paresthesia on injection Patient Tolerance:comfortable throughout block Complications:no Additional Notes Interpectoral-Pectoserratus Plane A high-frequency linear transducer, with sterile cover, was placed medial to the coracoid process in the paramedian sagittal plane. The transducer was moved caudally to the 4th rib and rotated slightly to allow an in-plane needle trajectory from medial to lateral. Pectoralis Major Muscle (PMM), Pectoralis Minor Muscle (PmM), Thoracoacromial Artery,  "Ribs, and Pleura were identified under ultrasound. The insertion site was prepped in sterile fashion and then localized with 2-5 ml of 1% Lidocaine. Using ultrasound-guidance, a 20-gauge B-Crowley 4\" Ultraplex 360 non-stimulating echogenic needle was advanced in plane until the tip of the needle was in the fascial plane between the PMM and PmM, lateral to the Thoracoacromial Artery. 1-3ml of preservative free normal saline was used to hydro-dissect the fascial planes. After the fascial plane was verified, 10ml local anesthetic (LA) was injected for Interpectoral fascial plane block. The needle was continued along the same path to the level of the 4th rib below PmM.  Initially preservative free normal saline was used to confirm needle position and then 20 ml of LA was injected for Pectoserratus fascial plane block. Aspiration every 5 ml to prevent intravascular injection. Injection was completed with negative aspiration of blood and negative intravascular injection. Injection pressures were normal with minimal resistance. Performed by: Sharan Pearson CRNA    Peripheral Block    Result Date: 12/26/2024  Sharan Pearson CRNA     12/26/2024  8:56 AM Peripheral Block Patient reassessed immediately prior to procedure Patient location during procedure: pre-op Start time: 12/26/2024 8:41 AM Reason for block: at surgeon's request and post-op pain management Performed by Anesthesiologist: Bro Paula MD Assisted by: Brandy Pino RN Preanesthetic Checklist Completed: patient identified, IV checked, site marked, risks and benefits discussed, surgical consent, monitors and equipment checked, pre-op evaluation and timeout performed Prep: Sterile barriers:cap, gloves, mask and washed/disinfected hands Prep: ChloraPrep Patient monitoring: blood pressure monitoring, continuous pulse oximetry and EKG Procedure Sedation: yes Performed under: local infiltration Guidance:ultrasound guided ULTRASOUND INTERPRETATION.  Using ultrasound " guidance a 20 G gauge needle was placed in close proximity to the nerve, at which point, under ultrasound guidance anesthetic was injected in the area of the nerve and spread of the anesthesia was seen on ultrasound in close proximity thereto.  There were no abnormalities seen on ultrasound; a digital image was taken; and the patient tolerated the procedure with no complications. Images:still images obtained, printed/placed on chart Laterality:right Block Type:interscalene Injection Technique:catheter Needle Type:Tuohy and echogenic Needle Gauge:18 G Resistance on Injection: none Catheter Size:20 G (20g) Cath Depth at skin: 9 cm Medications Used: fentaNYL citrate (PF) (SUBLIMAZE) injection - Intravenous  100 mcg - 12/26/2024 8:41:00 AM bupivacaine PF (MARCAINE) 0.25 % injection - Injection  8 mL - 12/26/2024 8:41:00 AM Post Assessment Injection Assessment: negative aspiration for heme, no paresthesia on injection and incremental injection Patient Tolerance:comfortable throughout block Complications:no Additional Notes CATHETER A high-frequency linear transducer, with sterile cover, was placed in the supraclavicular fossa to identify the subclavian artery and trunks and divisions of the brachial plexus. The transducer was then moved in a cephalad orientation with a slight rotation to continue visualization of the brachial plexus from the trunks and divisions, on to the C5-C7 roots. The insertion site was prepped and draped in sterile fashion. Skin and cutaneous tissue was infiltrated with 2-5 ml of 1% Lidocaine. Using ultrasound-guidance, an 18-gauge Contiplex Ultra 360 Touhy needle was advanced in plane from lateral to medial. Preservative-free normal saline was utilized for hydro-dissection of tissue, advancement of Touhy, and to confirm final needle placement at the fascial plane between the middle scalene muscle and sheath of the brachial plexus (C5-C7). A 20-gauge Contiplex Echo catheter was placed through the  "needle and advance out the tip of the Touhy 3-5 cm with the \"Gross Flip\". The Touhy needle was then removed, and final catheter position verified lateral to the brachial plexus with local anesthetic (LA) and ultrasound visualization. The catheter was secured in the usual fashion with skin glue, benzoin, steri-strips, CHG tegaderm and label noting \"Nerve Block Catheter\". Jerk tape applied at yellow connector and catheter connection. All LA was injected in increments of 3-5 ml after catheter secured. Aspiration every 5 ml to prevent intravascular injection. Injection was completed with negative aspiration of blood and negative intravascular injection. Injection pressures were normal with minimal resistance. Performed by: Sharan Pearson CRNA              Results for orders placed during the hospital encounter of 01/27/23    Adult Transthoracic Echo Complete W/ Cont if Necessary Per Protocol    Interpretation Summary    Left ventricular wall thickness is consistent with borderline concentric hypertrophy.    Left ventricular ejection fraction appears to be 56 - 60%.    Left ventricular diastolic function is consistent with (grade Ia w/high LAP) impaired relaxation.    The left atrial cavity is borderline dilated. 3.9 Cm    The right atrial cavity is borderline dilated.    No aortic valve regurgitation or stenosis is present    Trace to mild mitral valve regurgitation is present.    Trace to mild tricuspid valve regurgitation is present. RVSP- 17 mmHg    There is a moderate (1-2cm) circumferential pericardial effusion.    There is no evidence of cardiac tamponade      Plan for Follow-up of Pending Labs/Results:     Discharge Details        Discharge Medications        Continue These Medications        Instructions Start Date   acetaminophen 650 MG 8 hr tablet  Commonly known as: TYLENOL   650 mg, Every 8 Hours PRN      albuterol (2.5 MG/3ML) 0.083% nebulizer solution  Commonly known as: PROVENTIL   2.5 mg, Every 4 Hours " PRN      albuterol sulfate  (90 Base) MCG/ACT inhaler  Commonly known as: PROVENTIL HFA;VENTOLIN HFA;PROAIR HFA   2 puffs, Every 4 Hours PRN      ALPRAZolam 1 MG tablet  Commonly known as: XANAX   1 mg, 2 Times Daily PRN      benzoyl peroxide 5 % external wash  Generic drug: benzoyl peroxide   Use as directed by Dr. Shanthi Burks Aerosphere 160-9-4.8 MCG/ACT aerosol inhaler  Generic drug: Budeson-Glycopyrrol-Formoterol   2 puffs, 2 Times Daily      dilTIAZem  MG 24 hr capsule  Commonly known as: CARDIZEM CD   240 mg, Oral, Daily      fenofibrate 145 MG tablet  Commonly known as: TRICOR   145 mg, Oral, Daily      fexofenadine 180 MG tablet  Commonly known as: ALLEGRA   180 mg, Daily      fluticasone 50 MCG/ACT nasal spray  Commonly known as: FLONASE   2 sprays, Nasal, As Needed      furosemide 20 MG tablet  Commonly known as: LASIX   20 mg, Oral, Daily, As needed      HYDROcodone-acetaminophen 5-325 MG per tablet  Commonly known as: NORCO   1 tablet, Every 8 Hours PRN      indomethacin SR 75 MG CR capsule  Commonly known as: INDOCIN SR   75 mg, Oral, Daily      lansoprazole 30 MG capsule  Commonly known as: PREVACID   30 mg, Daily PRN      montelukast 10 MG tablet  Commonly known as: SINGULAIR   10 mg, Nightly      mupirocin 2 % ointment  Commonly known as: BACTROBAN   Administer 1 application into each nostril as directed by provider 2 (Two) Times a Day beginning 5 days before surgery      O2  Commonly known as: OXYGEN   2 L/min, Nightly      ondansetron 4 MG tablet  Commonly known as: ZOFRAN   Take 1 tablet by mouth Every 8 (Eight) Hours As Needed for pain/ post operative nausea      ondansetron 4 MG tablet  Commonly known as: ZOFRAN   Take 1 tablet by mouth Every 8 (Eight) Hours As Needed for post-op nausea      PARoxetine 30 MG tablet  Commonly known as: PAXIL   30 mg, Daily      Tirzepatide 10 MG/0.5ML solution auto-injector   10 mg, Weekly      valACYclovir 1000 MG tablet  Commonly known as:  VALTREX   1,000 mg, Oral, As Needed      Vitamin D3 1.25 MG (49504 UT) capsule   50,000 Units, Daily               Allergies   Allergen Reactions    Codeine Nausea Only    Flaxseed (Linseed) Other (See Comments)     Allergy test         Discharge Disposition:  Home or Self Care    Diet:  Hospital:  Diet Order   Procedures    Diet: Diabetic; Consistent Carbohydrate; Fluid Consistency: Thin (IDDSI 0)       Diet Instructions       Diet: Regular/House Diet; Regular (IDDSI 7); Thin (IDDSI 0)      Discharge Diet: Regular/House Diet    Texture: Regular (IDDSI 7)    Fluid Consistency: Thin (IDDSI 0)             Activity:  Activity Instructions       Activity as Tolerated      Other Activity Instructions      Activity Instructions: Elbow, wrist, and hand range of motion only. Otherwise remain in swing            Restrictions or Other Recommendations:         CODE STATUS:    Code Status and Medical Interventions: CPR (Attempt to Resuscitate); Full Support   Ordered at: 12/26/24 1324     Level Of Support Discussed With:    Patient     Code Status (Patient has no pulse and is not breathing):    CPR (Attempt to Resuscitate)     Medical Interventions (Patient has pulse or is breathing):    Full Support       Future Appointments   Date Time Provider Department Center   2/5/2025  8:00 AM Bety Domingo APRN [facility]       Additional Instructions for the Follow-ups that You Need to Schedule       Discharge Follow-up with PCP   As directed       Currently Documented PCP:    Taylor Moeller APRN    PCP Phone Number:    [phone]     Follow Up Details: 1 week        Discharge Follow-up with Specified Provider: Dr. Maki; 2 Weeks   As directed      To: Dr. Maki   Follow Up: 2 Weeks                      Cyril Carrington DO  12/27/24      Time Spent on Discharge:  I spent  25  minutes on this discharge activity which included: face-to-face encounter with the patient, reviewing the data in the system, coordination of the  care with the nursing staff as well as consultants, documentation, and entering orders.

## 2024-12-27 NOTE — THERAPY EVALUATION
Patient Name: Esther Cooper  : 1965    MRN: [id number]                              Today's Date: 2024       Admit Date: 2024    Visit Dx: No diagnosis found.  Patient Active Problem List   Diagnosis    Essential hypertension    Mixed hyperlipidemia    Chronic obstructive pulmonary disease    Shortness of breath    Obesity with body mass index of 30.0-39.9    Rotator cuff arthropathy of right shoulder     Past Medical History:   Diagnosis Date    Anxiety     Asthma     COPD (chronic obstructive pulmonary disease)     COVID 2021    Depression     GERD (gastroesophageal reflux disease)     History of transfusion     AT , PATIENT DENIES REACTION    Hypertension     Hypoglycemia     Insomnia     Mixed hyperlipidemia 2017    MRSA (methicillin resistant Staphylococcus aureus)     RIGHT SURGICAL SITE AREA, CHEST TUBE LOCATION    Non-small cell cancer of upper lobe of lung     Osteopenia of left hip     Status post right rotator cuff repair 10/2023     Past Surgical History:   Procedure Laterality Date    CARDIAC CATHETERIZATION      Normal as per pt    CARDIOVASCULAR STRESS TEST  2018    Lexiscan- no infarct or ischemia.     COLONOSCOPY      ECHO - CONVERTED  2018    EF 65%. Mod Pericardial Effusion    ECHO - CONVERTED  11/10/2021    EF 60%. LA- 3.7 Cm. Mild MR. RVSP- 15 mmHg. Mod Pericardial Effusion    ECHO - CONVERTED  2023    EF 60%. LA- 3.9. Trace-Mild MR. RVSP- 17 mmHg. Mod PE    EYE SURGERY Bilateral     CATARACTS    LUNG LOBECTOMY Right     right upper lobe    OTHER SURGICAL HISTORY  2019    Biotel cardiac monitor -19- baseline sinus, no arrhythmia    SHOULDER ARTHROSCOPY W/ ROTATOR CUFF REPAIR Left 2021    Procedure: SHOULDER SCOPE, MAJOR DEBRIDMENT WITH BICEPS TENOTOMY LEFT;  Surgeon: Reji Maki MD;  Location: [facility];  Service: Orthopedics;  Laterality: Left;    TONSILLECTOMY        General Information        Row Name 12/27/24 1129          Physical Therapy Time and Intention    Document Type evaluation  -     Mode of Treatment physical therapy  -       Row Name 12/27/24 1129          General Information    Patient Profile Reviewed yes  -AC     Prior Level of Function independent:;all household mobility;ADL's  -     Existing Precautions/Restrictions fall;oxygen therapy device and L/min;other (see comments);right;shoulder  s/p R RTSA, nerve cath, elbow/wrist/hand ROM only, sling on AAT, RUE NWB  -     Barriers to Rehab medically complex  -       Row Name 12/27/24 1129          Living Environment    People in Home significant other  -       Row Name 12/27/24 1129          Home Main Entrance    Number of Stairs, Main Entrance one  -AC     Stair Railings, Main Entrance none  -       Row Name 12/27/24 1129          Stairs Within Home, Primary    Number of Stairs, Within Home, Primary none  -       Row Name 12/27/24 1129          Cognition    Orientation Status (Cognition) oriented x 4  -       Row Name 12/27/24 1129          Safety Issues/Impairments Affecting Functional Mobility    Safety Issues Affecting Function (Mobility) insight into deficits/self-awareness;awareness of need for assistance  -     Impairments Affecting Function (Mobility) balance;endurance/activity tolerance;strength  -               User Key  (r) = Recorded By, (t) = Taken By, (c) = Cosigned By      Initials Name Provider Type    AC Latricia Youngblood, PT Physical Therapist                   Mobility       Row Name 12/27/24 1129          Bed Mobility    Comment, (Bed Mobility) Pt UIC upon arrival  -       Row Name 12/27/24 1129          Sit-Stand Transfer    Sit-Stand [place] (Transfers) contact guard;verbal cues  -     Comment, (Sit-Stand Transfer) VC for hand placement and sequencing  -       Row Name 12/27/24 1129          Gait/Stairs (Locomotion)    [place] Level (Gait) minimum assist (75% patient effort);1 person  assist  -AC     Assistive Device (Gait) other (see comments)  HHA  -AC     Distance in Feet (Gait) 80  -AC     Deviations/Abnormal Patterns (Gait) taylor decreased;gait speed decreased;base of support, wide;stride length decreased  -     Bilateral Gait Deviations forward flexed posture;heel strike decreased  -     [place] Level (Stairs) contact guard;2 person assist  -AC     Assistive Device (Stairs) cane, straight  -AC     Handrail Location (Stairs) none  -AC     Number of Steps (Stairs) 1  -AC     Ascending Technique (Stairs) step-to-step  -AC     Descending Technique (Stairs) step-to-step  -AC     Comment, (Gait/Stairs) Pt ambulated OOR w/ HHA and minAx1 for balance. Pt demonstrated wide base of support, increased trunk sway, and decreased gait speed. no LOB or buckling noted. Pt desat to 87% however recovered to 94% after ~15 second rest break. In addition pt ambulated up/down 1 step w/ CGAx2 and SPC. Pt required VC for sequncing.  -       Row Name 12/27/24 1129          Mobility    Extremity Weight-bearing Status right upper extremity  -     Right Upper Extremity (Weight-bearing Status) non weight-bearing (NWB)  -               User Key  (r) = Recorded By, (t) = Taken By, (c) = Cosigned By      Initials Name Provider Type    AC Latricia Youngblood, PT Physical Therapist                   Obj/Interventions       Row Name 12/27/24 1134          Range of Motion Comprehensive    General Range of Motion bilateral lower extremity ROM WNL  -       Row Name 12/27/24 1134          Strength Comprehensive (MMT)    General Manual Muscle Testing (MMT) Assessment lower extremity strength deficits identified  -     Comment, General Manual Muscle Testing (MMT) Assessment BLE grossly WFL  -       Row Name 12/27/24 1134          Motor Skills    Motor Skills functional endurance  -     Functional Endurance below baseline, 3L O2 desat to 87% w/ ambulation  -       Row Name 12/27/24 1134          Balance     Balance Assessment sitting static balance;standing static balance;sitting dynamic balance;standing dynamic balance  -AC     Static Sitting Balance standby assist  -AC     Dynamic Sitting Balance standby assist  -AC     Position, Sitting Balance sitting in chair  -AC     Static Standing Balance contact guard  -AC     Dynamic Standing Balance minimal assist  -AC     Position/Device Used, Standing Balance supported  -AC     Balance Interventions sitting;standing;sit to stand;supported;static;dynamic  -AC               User Key  (r) = Recorded By, (t) = Taken By, (c) = Cosigned By      Initials Name Provider Type    AC Latricia Youngblood, PT Physical Therapist                   Goals/Plan       Row Name 12/27/24 1141          Bed Mobility Goal 1 (PT)    Activity/Assistive Device (Bed Mobility Goal 1, PT) sit to supine/supine to sit  -AC     [place] Level/Cues Needed (Bed Mobility Goal 1, PT) modified independence  -AC     Time Frame (Bed Mobility Goal 1, PT) long term goal (LTG);3 days  -       Row Name 12/27/24 1141          Transfer Goal 1 (PT)    Activity/Assistive Device (Transfer Goal 1, PT) sit-to-stand/stand-to-sit  -AC     [place] Level/Cues Needed (Transfer Goal 1, PT) modified independence  -AC     Time Frame (Transfer Goal 1, PT) long term goal (LTG);3 days  -       Row Name 12/27/24 1141          Gait Training Goal 1 (PT)    Activity/Assistive Device (Gait Training Goal 1, PT) gait (walking locomotion)  -AC     [place] Level (Gait Training Goal 1, PT) modified independence  -AC     Distance (Gait Training Goal 1, PT) 150  -AC     Time Frame (Gait Training Goal 1, PT) long term goal (LTG);3 days  -       Row Name 12/27/24 1141          Therapy Assessment/Plan (PT)    Planned Therapy Interventions (PT) balance training;bed mobility training;gait training;home exercise program;patient/family education;stair training;strengthening;stretching;transfer training  -AC               User Key  (r) =  Recorded By, (t) = Taken By, (c) = Cosigned By      Initials Name Provider Type    AC Latricia Youngblood, PT Physical Therapist                   Clinical Impression       Row Name 12/27/24 1137          Pain    Pretreatment Pain Rating 0/10 - no pain  -AC     Posttreatment Pain Rating 0/10 - no pain  -AC       Row Name 12/27/24 1137          Plan of Care Review    Plan of Care Reviewed With patient  -AC     Progress no change  -AC     Outcome Evaluation PT initial evaluation complete. Pt presents w/ impaired balance and decreased activity tolerance. Pt ambulated OOR w/ minAx1 and HHA however desatted to 87% requiring 1 standing rest break. In addition pt complete 1 step w/ SPC and CGAx2 w/o any LOB or buckling. Continue to progress POC as able. D/c rec is home w/ assist and HHPT/OT  -AC       Row Name 12/27/24 1137          Therapy Assessment/Plan (PT)    Rehab Potential (PT) good  -AC     Criteria for Skilled Interventions Met (PT) yes  -AC     Therapy Frequency (PT) daily  -AC     Predicted Duration of Therapy Intervention (PT) 2  -AC       Row Name 12/27/24 1137          Vital Signs    Pre SpO2 (%) 96  -AC     O2 Delivery Pre Treatment nasal cannula  -AC     Intra SpO2 (%) 87  -AC     O2 Delivery Intra Treatment nasal cannula  -AC     Post SpO2 (%) 94  -AC     O2 Delivery Post Treatment nasal cannula  -AC     Pre Patient Position Sitting  -AC     Intra Patient Position Standing  -AC     Post Patient Position Sitting  -AC       Row Name 12/27/24 1137          Positioning and Restraints    Pre-Treatment Position sitting in chair/recliner  -AC     Post Treatment Position chair  -AC     In Chair notified nsg;sitting;call light within reach;encouraged to call for assist;exit alarm on;waffle cushion;legs elevated;reclined;with OT  -AC               User Key  (r) = Recorded By, (t) = Taken By, (c) = Cosigned By      Initials Name Provider Type    AC Latricia Youngblood, PT Physical Therapist                   Outcome Measures        Row Name 12/27/24 1141 12/27/24 0929       How much help from another person do you currently need...    Turning from your back to your side while in flat bed without using bedrails? 3  -AC 3  -AP    Moving from lying on back to sitting on the side of a flat bed without bedrails? 3  -AC 3  -AP    Moving to and from a bed to a chair (including a wheelchair)? 3  -AC 3  -AP    Standing up from a chair using your arms (e.g., wheelchair, bedside chair)? 3  -AC 3  -AP    Climbing 3-5 steps with a railing? 2  -AC 3  -AP    To walk in hospital room? 3  -AC 3  -AP    AM-PAC 6 Clicks Score (PT) 17  -AC 18  -AP    Highest Level of Mobility Goal 5 --> Static standing  -AC 6 --> Walk 10 steps or more  -AP      Row Name 12/27/24 1141          Functional Assessment    Outcome Measure Options AM-PAC 6 Clicks Basic Mobility (PT)  -               User Key  (r) = Recorded By, (t) = Taken By, (c) = Cosigned By      Initials Name Provider Type    AP Karina Rosen RN Registered Nurse     Latricia Youngblood, PT Physical Therapist                                 Physical Therapy Education       Title: PT OT SLP Therapies (In Progress)       Topic: Physical Therapy (In Progress)       Point: Mobility training (Done)       Learning Progress Summary            Patient Acceptance, E, VU,NR by  at 12/27/2024 1141                      Point: Home exercise program (Not Started)       Learner Progress:  Not documented in this visit.              Point: Body mechanics (Done)       Learning Progress Summary            Patient Acceptance, E, VU,NR by  at 12/27/2024 1141                      Point: Precautions (Done)       Learning Progress Summary            Patient Acceptance, E, VU,NR by  at 12/27/2024 1141                                      User Key       Initials Effective Dates Name Provider Type Discipline     07/11/24 -  Latricia Youngblood, PT Physical Therapist PT                  PT Recommendation and Plan  Planned Therapy Interventions  (PT): balance training, bed mobility training, gait training, home exercise program, patient/family education, stair training, strengthening, stretching, transfer training  Progress: no change  Outcome Evaluation: PT initial evaluation complete. Pt presents w/ impaired balance and decreased activity tolerance. Pt ambulated OOR w/ minAx1 and HHA however desatted to 87% requiring 1 standing rest break. In addition pt complete 1 step w/ SPC and CGAx2 w/o any LOB or buckling. Continue to progress POC as able. D/c rec is home w/ assist and HHPT/OT     Time Calculation:   PT Evaluation Complexity  History, PT Evaluation Complexity: 1-2 personal factors and/or comorbidities  Examination of Body Systems (PT Eval Complexity): total of 3 or more elements  Clinical Presentation (PT Evaluation Complexity): stable  Clinical Decision Making (PT Evaluation Complexity): low complexity  Overall Complexity (PT Evaluation Complexity): low complexity     PT Charges       Row Name 12/27/24 1142             Time Calculation    Start Time 0830  -AC      PT Received On 12/27/24  -AC      PT Goal Re-Cert Due Date 01/06/25  -AC         Time Calculation- PT    Total Timed Code Minutes- PT 12 minute(s)  -AC         Timed Charges    31328 - Gait Training Minutes  12  -AC         Untimed Charges    PT Eval/Re-eval Minutes 50  -AC         Total Minutes    Timed Charges Total Minutes 12  -AC      Untimed Charges Total Minutes 50  -AC       Total Minutes 62  -AC                User Key  (r) = Recorded By, (t) = Taken By, (c) = Cosigned By      Initials Name Provider Type    AC Latricia Youngblood, PT Physical Therapist                  Therapy Charges for Today       Code Description Service Date Service Provider Modifiers Qty    83717237323 HC GAIT TRAINING EA 15 MIN 12/27/2024 Latricia Youngblood, PT GP 1    72049138365 HC PT EVAL LOW COMPLEXITY 4 12/27/2024 Latricia Youngblood, PT GP 1            PT G-Codes  Outcome Measure Options: AM-PAC 6 Clicks Basic Mobility  (PT)  AM-PAC 6 Clicks Score (PT): 17  AM-PAC 6 Clicks Score (OT): 17  PT Discharge Summary  Anticipated Discharge Disposition (PT): home with assist, home with home health    Latricia Youngblood, PT  12/27/2024

## 2024-12-27 NOTE — DISCHARGE INSTR - ACTIVITY
Plan will be to allow her to do elbow, wrist, and hand range of motion only.   She should remain in the sling otherwise.   Dressing to remain in place until follow up.   Follow up in approximately two weeks as scheduled  Ok to shower with dressing in place. Keep dry..

## 2024-12-27 NOTE — PROGRESS NOTES
I talked to this patient by telephone. I’ve talked to nursing staff also. Patient is doing well.    If this patient clears therapy this morning, I would be OK with discharge home.    Plan will be to allow her to do elbow, wrist, and hand range of motion only.   She should remain in the sling otherwise.   Dressing to remain in place until follow up.   Follow up in approximately two weeks as scheduled

## 2025-02-05 ENCOUNTER — OFFICE VISIT (OUTPATIENT)
Dept: CARDIOLOGY | Facility: CLINIC | Age: 60
End: 2025-02-05
Payer: COMMERCIAL

## 2025-02-05 VITALS
DIASTOLIC BLOOD PRESSURE: 70 MMHG | HEART RATE: 78 BPM | BODY MASS INDEX: 27 KG/M2 | SYSTOLIC BLOOD PRESSURE: 128 MMHG | WEIGHT: 168 LBS | HEIGHT: 66 IN

## 2025-02-05 DIAGNOSIS — I10 ESSENTIAL HYPERTENSION: Primary | ICD-10-CM

## 2025-02-05 DIAGNOSIS — R06.02 SHORTNESS OF BREATH: ICD-10-CM

## 2025-02-05 DIAGNOSIS — E78.2 MIXED HYPERLIPIDEMIA: ICD-10-CM

## 2025-02-05 DIAGNOSIS — Z85.118 HISTORY OF LUNG CANCER: ICD-10-CM

## 2025-02-05 RX ORDER — FENOFIBRATE 145 MG/1
145 TABLET, COATED ORAL DAILY
Qty: 90 TABLET | Refills: 3 | Status: SHIPPED | OUTPATIENT
Start: 2025-02-05

## 2025-02-05 RX ORDER — DILTIAZEM HYDROCHLORIDE 240 MG/1
240 CAPSULE, COATED, EXTENDED RELEASE ORAL DAILY
Qty: 90 CAPSULE | Refills: 3 | Status: SHIPPED | OUTPATIENT
Start: 2025-02-05

## 2025-02-05 NOTE — PROGRESS NOTES
Chief Complaint   Patient presents with    Follow-up     Cardiac management . Had lobectomy to right lung since last office visit and had total shoulder replacement to right shoulder .    LABS     Results December 2024 in chart    Shortness of Breath     Patient reports having SOA  but has been told by cardiothoracic that is most likely her body adjusting to lobectomy   .    Med Refill     Needs refills  on fenofibrate and Cardizem 90 day supply to [facility] pharmacy       Subjective       Esther Cooper is a 59 y.o. female with HTN, hypercholesterolemia, nocturnal oxygen desaturation, COPD, and palpitations. 2018 Lexiscan stress test negative for ischemia, CT scanning of chest showed moderate pericardial effusion improved with medication management. In 2019 cardiac monitor was negative for arrhythmia. In 2021 developed COVID and mono. Repeat echo showed mild LVH, EF around 60%, mild MR, RVSP 50 mmHg, moderate circumferential pericardial effusion. Indocin prescribed. On 4/3/2023 CT of chest showed normal cardiac size and persistent moderate pericardial effusion, coarse atherosclerotic plaque in coronary arteries and mild plaque in aorta without aneurysm dilation.     In 2024 she was diagnosed with adenocarcinoma of RUL, R VATS right upper lobectomy 7/18/2024.  In December 2024 she underwent right shoulder arthroplasty.    Today she returns to the office for a follow up visit. She admits to increase in heart rate since lung surgery but not concerning elevation. Blood pressure remains stable.  She is recovering well from her recent shoulder surgery and plans to start more aggressive physical therapy after 3 months healing time.    Cardiac History:    Past Surgical History:   Procedure Laterality Date    CARDIAC CATHETERIZATION  1999    Normal as per pt    CARDIOVASCULAR STRESS TEST  07/05/2018    Lexiscan- no infarct or ischemia.     COLONOSCOPY      ECHO - CONVERTED  11/14/2018    EF 65%. Mod Pericardial Effusion     ECHO - CONVERTED  11/10/2021    EF 60%. LA- 3.7 Cm. Mild MR. RVSP- 15 mmHg. Mod Pericardial Effusion    ECHO - CONVERTED  01/27/2023    EF 60%. LA- 3.9. Trace-Mild MR. RVSP- 17 mmHg. Mod PE    EYE SURGERY Bilateral     CATARACTS    LUNG LOBECTOMY Right     right upper lobe    OTHER SURGICAL HISTORY  05/29/2019    Biotel cardiac monitor 5/16-5/29/19- baseline sinus, no arrhythmia    SHOULDER ARTHROSCOPY W/ ROTATOR CUFF REPAIR Left 05/13/2021    Procedure: SHOULDER SCOPE, MAJOR DEBRIDMENT WITH BICEPS TENOTOMY LEFT;  Surgeon: Reji Maki MD;  Location:  [facility];  Service: Orthopedics;  Laterality: Left;    TONSILLECTOMY      TOTAL SHOULDER ARTHROPLASTY W/ DISTAL CLAVICLE EXCISION Right 12/26/2024    Procedure: TOTAL SHOULDER REVERSE ARTHROPLASTY RIGHT;  Surgeon: Reji Maki MD;  Location:  [facility];  Service: Orthopedics;  Laterality: Right;       Current Outpatient Medications   Medication Sig Dispense Refill    acetaminophen (TYLENOL) 650 MG 8 hr tablet Take 1 tablet by mouth Every 8 (Eight) Hours As Needed for Mild Pain.      albuterol (PROVENTIL HFA;VENTOLIN HFA) 108 (90 BASE) MCG/ACT inhaler Inhale 2 puffs Every 4 (Four) Hours As Needed for Wheezing.      albuterol (PROVENTIL) (2.5 MG/3ML) 0.083% nebulizer solution Take 2.5 mg by nebulization Every 4 (Four) Hours As Needed for Wheezing. Pt uses every morning.      ALPRAZolam (XANAX) 1 MG tablet Take 1 tablet by mouth 2 (Two) Times a Day As Needed.      benzoyl peroxide 5 % external wash Use as directed by Dr. Maki 148 g 0    Budeson-Glycopyrrol-Formoterol (Breztri Aerosphere) 160-9-4.8 MCG/ACT aerosol inhaler Inhale 2 puffs 2 (Two) Times a Day.      Cholecalciferol (Vitamin D3) 1.25 MG (55352 UT) capsule Take 1 capsule by mouth Daily. SATURDAYS      dilTIAZem CD (CARDIZEM CD) 240 MG 24 hr capsule Take 1 capsule by mouth Daily. 90 capsule 3    fenofibrate (TRICOR) 145 MG tablet Take 1 tablet by mouth Daily. 90 tablet 3    fexofenadine  (ALLEGRA) 180 MG tablet Take 1 tablet by mouth Daily.      fluticasone (FLONASE) 50 MCG/ACT nasal spray Administer 2 sprays into the nostril(s) as directed by provider As Needed for Allergies.      furosemide (LASIX) 20 MG tablet Take 1 tablet by mouth Daily. As needed 90 tablet 3    HYDROcodone-acetaminophen (NORCO) 5-325 MG per tablet Take 1 tablet by mouth Every 8 (Eight) Hours As Needed.      indomethacin SR (INDOCIN SR) 75 MG CR capsule Take 1 capsule by mouth Daily. 90 capsule 3    lansoprazole (PREVACID) 30 MG capsule Take 1 capsule by mouth Daily As Needed.      montelukast (SINGULAIR) 10 MG tablet Take 1 tablet by mouth Every Night.      mupirocin (BACTROBAN) 2 % ointment Administer 1 application into each nostril as directed by provider 2 (Two) Times a Day beginning 5 days before surgery 22 g 0    O2 (OXYGEN) Inhale 2 L/min Every Night.      ondansetron (ZOFRAN) 4 MG tablet Take 1 tablet by mouth Every 8 (Eight) Hours As Needed for pain/ post operative nausea 30 tablet 0    ondansetron (ZOFRAN) 4 MG tablet Take 1 tablet by mouth Every 8 (Eight) Hours As Needed for post-op nausea 30 tablet 0    PARoxetine (PAXIL) 30 MG tablet Take 1 tablet by mouth Daily.      Tirzepatide (MOUNJARO) 10 MG/0.5ML solution pen-injector pen Inject 10 mg under the skin into the appropriate area as directed 1 (One) Time Per Week. LAST INJECTION 12/17/2024      valACYclovir (VALTREX) 1000 MG tablet Take 1 tablet by mouth As Needed.       No current facility-administered medications for this visit.       Codeine and Flaxseed (linseed)    Past Medical History:   Diagnosis Date    Anxiety     Asthma     COPD (chronic obstructive pulmonary disease)     COVID 08/2021    Depression     GERD (gastroesophageal reflux disease)     History of transfusion 2024    AT , PATIENT DENIES REACTION    Hypertension     Hypoglycemia     Insomnia     Mixed hyperlipidemia 05/22/2017    MRSA (methicillin resistant Staphylococcus aureus) 2024    RIGHT  "SURGICAL SITE AREA, CHEST TUBE LOCATION    Non-small cell cancer of upper lobe of lung     Osteopenia of left hip     Status post right rotator cuff repair 10/2023       Social History     Socioeconomic History    Marital status: Significant Other   Tobacco Use    Smoking status: Former     Current packs/day: 0.00     Average packs/day: 1.5 packs/day for 24.0 years (36.0 ttl pk-yrs)     Types: Cigarettes     Start date: 1982     Quit date: 2006     Years since quittin.1    Smokeless tobacco: Never   Vaping Use    Vaping status: Never Used   Substance and Sexual Activity    Alcohol use: Not Currently     Comment: occasional    Drug use: No    Sexual activity: Yes     Partners: Male     Birth control/protection: Post-menopausal       Family History   Problem Relation Age of Onset    Heart attack Mother     Hypertension Mother     COPD Mother     Heart disease Mother     Heart failure Mother        Review of Systems   Constitutional: Negative for diaphoresis and fever.   Cardiovascular:  Negative for chest pain, dyspnea on exertion, irregular heartbeat, leg swelling, near-syncope and palpitations.   Respiratory:  Positive for shortness of breath.    Hematologic/Lymphatic: Negative for bleeding problem. Does not bruise/bleed easily.   Musculoskeletal:  Positive for joint pain and stiffness. Negative for falls.   Neurological:  Negative for dizziness.   Psychiatric/Behavioral:  Negative for memory loss. The patient is not nervous/anxious.         BP Readings from Last 5 Encounters:   25 128/70   24 148/80   24 152/78   23 134/70   23 136/62       Wt Readings from Last 5 Encounters:   25 76.2 kg (168 lb)   24 74.4 kg (164 lb)   24 74.7 kg (164 lb 12.7 oz)   24 69.9 kg (154 lb 3.2 oz)   23 78.7 kg (173 lb 9.6 oz)       Objective     /70 (BP Location: Left arm, Patient Position: Sitting, Cuff Size: Adult)   Pulse 78   Ht 167.6 cm (66\")  "  Wt 76.2 kg (168 lb)   BMI 27.12 kg/m²     Vitals and nursing note reviewed.   Constitutional:       Appearance: Not in distress.   HENT:      Head: Normocephalic.   Neck:      Vascular: No carotid bruit.   Pulmonary:      Effort: Pulmonary effort is normal.      Breath sounds: Normal breath sounds.   Cardiovascular:      PMI at left midclavicular line. Normal rate. Regular rhythm.      Murmurs: There is no murmur.   Edema:     Peripheral edema absent.   Abdominal:      General: Bowel sounds are normal.      Palpations: Abdomen is soft.   Musculoskeletal:      Cervical back: Neck supple. Skin:     General: Skin is warm and dry.   Neurological:      Mental Status: Alert, oriented to person, place, and time and oriented to person, place and time.          Procedures: none today          Assessment & Plan   Diagnoses and all orders for this visit:    1. Essential hypertension (Primary)  -     dilTIAZem CD (CARDIZEM CD) 240 MG 24 hr capsule; Take 1 capsule by mouth Daily.  Dispense: 90 capsule; Refill: 3    2. Mixed hyperlipidemia  -     fenofibrate (TRICOR) 145 MG tablet; Take 1 tablet by mouth Daily.  Dispense: 90 tablet; Refill: 3    3. Shortness of breath    4. History of lung cancer      HTN  -BP at goal  -Heart rate and rhythm normal  -Continue surveillance of vital signs  -Continue Lasix, Cardizem CD    Mixed hyperlipidemia  -Labs per PCP  -Continue Tricor and diet efforts    Shortness of breath/history lung cancer  -Has supplemental oxygen to use as needed  -Recent follow-up CT scan per thoracic surgeon: No new nodules or adenopathy with recommendation to keep an eye on faint ggo in left lower lobe.  -Followed locally by Dr. Lilly.    Patient appears stable from a cardiac standpoint.  Routine follow-up visit scheduled.  Please call sooner for cardiac concerns.               Electronically signed by MIMI Camarena,  February 5, 2025 11:34 EST    Dictated Utilizing Dragon Dictation: Part of this note may  be an electronic transcription/translation of spoken language to printed text using the Dragon Dictation System.

## 2025-07-31 DIAGNOSIS — R60.1 GENERALIZED EDEMA: ICD-10-CM

## 2025-07-31 RX ORDER — FUROSEMIDE 20 MG/1
20 TABLET ORAL DAILY
Qty: 90 TABLET | Refills: 3 | Status: SHIPPED | OUTPATIENT
Start: 2025-07-31

## 2025-08-05 ENCOUNTER — OFFICE VISIT (OUTPATIENT)
Dept: CARDIOLOGY | Facility: CLINIC | Age: 60
End: 2025-08-05
Payer: COMMERCIAL

## 2025-08-05 VITALS
HEART RATE: 72 BPM | HEIGHT: 66 IN | SYSTOLIC BLOOD PRESSURE: 120 MMHG | WEIGHT: 158 LBS | DIASTOLIC BLOOD PRESSURE: 70 MMHG | BODY MASS INDEX: 25.39 KG/M2

## 2025-08-05 DIAGNOSIS — I10 ESSENTIAL HYPERTENSION: Primary | ICD-10-CM

## 2025-08-05 DIAGNOSIS — E78.2 MIXED HYPERLIPIDEMIA: ICD-10-CM

## 2025-08-05 DIAGNOSIS — R06.02 SHORTNESS OF BREATH: ICD-10-CM

## 2025-08-05 DIAGNOSIS — I31.39 PERICARDIAL EFFUSION: ICD-10-CM

## 2025-08-05 PROCEDURE — 99213 OFFICE O/P EST LOW 20 MIN: CPT | Performed by: NURSE PRACTITIONER

## 2025-08-05 RX ORDER — INDOMETHACIN 75 MG/1
75 CAPSULE, EXTENDED RELEASE ORAL DAILY
Qty: 90 CAPSULE | Refills: 3 | Status: SHIPPED | OUTPATIENT
Start: 2025-08-05

## (undated) DEVICE — SWABSTK SKINPREP PVPI PRE/SAT 8IN STRL

## (undated) DEVICE — NEEDLE, QUINCKE, 18GX3.5": Brand: MEDLINE

## (undated) DEVICE — ISO/ALC 70PCT 16OZ

## (undated) DEVICE — GLV SURG SENSICARE PI MIC PF SZ7.5 LF STRL

## (undated) DEVICE — PUMP PAIN AUTOFUSER AUTO SELCT NOBOLUS 1TO14ML/HR 550ML DISP

## (undated) DEVICE — GLV SURG SENSICARE PI ORTHO SZ7.5 LF STRL

## (undated) DEVICE — 1010 S-DRAPE TOWEL DRAPE 10/BX: Brand: STERI-DRAPE™

## (undated) DEVICE — TUBING, SUCTION, 1/4" X 10', STRAIGHT: Brand: MEDLINE

## (undated) DEVICE — INTENDED FOR TISSUE SEPARATION, AND OTHER PROCEDURES THAT REQUIRE A SHARP SURGICAL BLADE TO PUNCTURE OR CUT.: Brand: BARD-PARKER ® STAINLESS STEEL BLADES

## (undated) DEVICE — UNDERGLV SURG BIOGEL INDICAT PF 61/2 GRN

## (undated) DEVICE — 3M™ MEDIPORE™ H SOFT CLOTH SURGICAL TAPE, 2863, 3 IN X 10 YD, 12/CASE: Brand: 3M™ MEDIPORE™

## (undated) DEVICE — NDL FLTR BLNT 18G 1 1/2IN

## (undated) DEVICE — PK MAJ SHLDR SPLT 10

## (undated) DEVICE — SUCTION RING WITH TUBING: Brand: NEPTUNE

## (undated) DEVICE — PATIENT RETURN ELECTRODE, SINGLE-USE, CONTACT QUALITY MONITORING, ADULT, WITH 9FT CORD, FOR PATIENTS WEIGING OVER 33LBS. (15KG): Brand: MEGADYNE

## (undated) DEVICE — 90-S CRUISE, SUCTION PROBE, NON-BENDABLE, MAX CUT LEVEL 1: Brand: SERFAS ENERGY

## (undated) DEVICE — 3M™ STERI-DRAPE™ U-DRAPE 1015: Brand: STERI-DRAPE™

## (undated) DEVICE — PRECISION THIN (7.0 X 0.38 X 29.5MM)

## (undated) DEVICE — PENCL ROCKRSWCH MEGADYNE W/HOLSTR 10FT SS

## (undated) DEVICE — T-MAX DISPOSABLE FACE MASK 8 PER BOX

## (undated) DEVICE — TB CASSET ARTHSCP CROSSFLOW INFLOW LF

## (undated) DEVICE — SHOULDER STABILIZATION KIT,                                    DISPOSABLE 12 PER BOX

## (undated) DEVICE — BLD CUT FORMLA RESECTOR 4.0MM

## (undated) DEVICE — ELECTRD BLD EZ CLN STD 2.5IN

## (undated) DEVICE — SPNG GZ WOVN 4X4IN 12PLY 10/BX STRL

## (undated) DEVICE — UNDERGLV SURG BIOGEL INDICAT PI SZ8 BLU

## (undated) DEVICE — DRSNG PAD ABD 8X10IN STRL

## (undated) DEVICE — ELECTRD BLD EZ CLN STD 6.5IN

## (undated) DEVICE — SUT ETHLN 3/0 PC5 18IN 1893G

## (undated) DEVICE — GLV SURG PREMIERPRO MIC LTX PF SZ6.5 BRN

## (undated) DEVICE — GOWN,NON-REINFORCED,SIRUS,SET IN SLV,XL: Brand: MEDLINE

## (undated) DEVICE — ARM SLING: Brand: DEROYAL

## (undated) DEVICE — SYR LL 3CC